# Patient Record
Sex: MALE | Race: WHITE | NOT HISPANIC OR LATINO | Employment: FULL TIME | URBAN - METROPOLITAN AREA
[De-identification: names, ages, dates, MRNs, and addresses within clinical notes are randomized per-mention and may not be internally consistent; named-entity substitution may affect disease eponyms.]

---

## 2017-05-08 ENCOUNTER — ALLSCRIPTS OFFICE VISIT (OUTPATIENT)
Dept: OTHER | Facility: OTHER | Age: 47
End: 2017-05-08

## 2018-01-10 NOTE — RESULT NOTES
Message   Lyme is negative; labs are stable- lipds improved         Verified Results  (1) COMPREHENSIVE METABOLIC PANEL [id number] 78:90MB Emilie Oh     Test Name Result Flag Reference   Glucose, Serum 93 mg/dL  65-99   BUN 15 mg/dL  6-24   Creatinine, Serum 0 81 mg/dL  0 76-1 27   eGFR If NonAfricn Am 105 mL/min/1 73  >59   eGFR If Africn Am 121 mL/min/1 73  >59   BUN/Creatinine Ratio 19  9-20   Sodium, Serum 140 mmol/L  134-144   **Please note reference interval change**   Potassium, Serum 4 9 mmol/L  3 5-5 2   Chloride, Serum 98 mmol/L     **Please note reference interval change**   Carbon Dioxide, Total 27 mmol/L  18-29   Calcium, Serum 9 9 mg/dL  8 7-10 2   Protein, Total, Serum 7 6 g/dL  6 0-8 5   Albumin, Serum 4 8 g/dL  3 5-5 5   Globulin, Total 2 8 g/dL  1 5-4 5   A/G Ratio 1 7  1 1-2 5   Bilirubin, Total 0 6 mg/dL  0 0-1 2   Alkaline Phosphatase, S 83 IU/L     AST (SGOT) 34 IU/L  0-40   ALT (SGPT) 47 IU/L H 0-44     (1) CBC/PLT/DIFF [id number] 11:28AM Emilie Oh     Test Name Result Flag Reference   WBC 7 6 x10E3/uL  3 4-10 8   RBC 5 26 x10E6/uL  4 14-5 80   Hemoglobin 15 9 g/dL  12 6-17 7   Hematocrit 48 4 %  37 5-51 0   MCV 92 fL  79-97   MCH 30 2 pg  26 6-33 0   MCHC 32 9 g/dL  31 5-35 7   RDW 13 3 %  12 3-15 4   Platelets 222 L30L9/EV  150-379   Neutrophils 69 %     Lymphs 22 %     Monocytes 6 %     Eos 2 %     Basos 1 %     Neutrophils (Absolute) 5 3 x10E3/uL  1 4-7 0   Lymphs (Absolute) 1 6 x10E3/uL  0 7-3 1   Monocytes(Absolute) 0 5 x10E3/uL  0 1-0 9   Eos (Absolute) 0 1 x10E3/uL  0 0-0 4   Baso (Absolute) 0 0 x10E3/uL  0 0-0 2   Immature Granulocytes 0 %     Immature Grans (Abs) 0 0 x10E3/uL  0 0-0 1     (1) TSH [id number] 11:28AM Emilie Oh     Test Name Result Flag Reference   TSH 1 710 uIU/mL  0 450-4 500     (LC) Lipid Panel [id number] 11:28AM Emilie Oh     Test Name Result Flag Reference   Cholesterol, Total 220 mg/dL H 100-199 Triglycerides 81 mg/dL  0-149   HDL Cholesterol 72 mg/dL  >39   VLDL Cholesterol Spencer 16 mg/dL  5-40   LDL Cholesterol Calc 132 mg/dL H 0-99     (LC) Lyme Ab/Western Blot Reflex [id number] 11:28AM Praveena OSBORNE courtesy copy of this report has been sent to  [address]  Test Name Result Flag Reference   Lyme IgG/IgM Ab <0 91 ISR  0 00-0 90   Negative         <0 91                                                 Equivocal  0 91 - 1 09                                                 Positive         >1 09   Lyme Disease Ab, Quant, IgM <0 80 index  0 00-0 79   Negative         <0 80                                                 Equivocal  0 80 - 1 19                                                 Positive         >1 19                  IgM levels may peak at 3-6 weeks post infection, then                  gradually decline

## 2018-01-12 VITALS
RESPIRATION RATE: 16 BRPM | DIASTOLIC BLOOD PRESSURE: 82 MMHG | BODY MASS INDEX: 33.92 KG/M2 | HEIGHT: 69 IN | HEART RATE: 72 BPM | SYSTOLIC BLOOD PRESSURE: 128 MMHG | TEMPERATURE: 98 F | WEIGHT: 229 LBS

## 2018-01-12 NOTE — RESULT NOTES
Verified Results  (1) CBC/PLT/DIFF [id number] 10:06AM [facility]ivani [employer]     Test Name Result Flag Reference   WBC 6 8 x10E3/uL  3 4-10 8   RBC 5 21 x10E6/uL  4 14-5 80   Hemoglobin 15 9 g/dL  12 6-17 7   Hematocrit 46 6 %  37 5-51 0   MCV 89 fL  79-97   MCH 30 5 pg  26 6-33 0   MCHC 34 1 g/dL  31 5-35 7   RDW 13 8 %  12 3-15 4   Platelets 440 Y30H9/IO  150-379   Neutrophils 64 %     Lymphs 24 %     Monocytes 8 %     Eos 3 %     Basos 1 %     Neutrophils (Absolute) 4 4 x10E3/uL  1 4-7 0   Lymphs (Absolute) 1 6 x10E3/uL  0 7-3 1   Monocytes(Absolute) 0 5 x10E3/uL  0 1-0 9   Eos (Absolute) 0 2 x10E3/uL  0 0-0 4   Baso (Absolute) 0 1 x10E3/uL  0 0-0 2   Immature Granulocytes 0 %     Immature Grans (Abs) 0 0 x10E3/uL  0 0-0 1     (1) COMPREHENSIVE METABOLIC PANEL [id number] 59:67AW [facility]iv[facility] [employer]     Test Name Result Flag Reference   Glucose, Serum 95 mg/dL  65-99   BUN 12 mg/dL  6-24   Creatinine, Serum 0 90 mg/dL  0 76-1 27   eGFR If NonAfricn Am 101 mL/min/1 73  >59   eGFR If Africn Am 117 mL/min/1 73  >59   BUN/Creatinine Ratio 13  9-20   Sodium, Serum 138 mmol/L  134-144   Potassium, Serum 5 4 mmol/L H 3 5-5 2   Chloride, Serum 99 mmol/L     Carbon Dioxide, Total 25 mmol/L  18-29   Calcium, Serum 9 8 mg/dL  8 7-10 2   Protein, Total, Serum 7 3 g/dL  6 0-8 5   Albumin, Serum 4 8 g/dL  3 5-5 5   Globulin, Total 2 5 g/dL  1 5-4 5   A/G Ratio 1 9  1 1-2 5   Bilirubin, Total 0 6 mg/dL  0 0-1 2   Alkaline Phosphatase, S 82 IU/L     AST (SGOT) 29 IU/L  0-40   ALT (SGPT) 33 IU/L  0-44     (LC) UA/M w/rflx Culture, Routine [id number] 10:06AM Shivani Carry     Test Name Result Flag Reference   Specific Gravity 1 008  1 005-1 030   pH 7 0  5 0-7 5   Urine-Color Yellow  Yellow   Appearance Clear  Clear   WBC Esterase Negative  Negative   Protein Negative  Negative/Trace   Glucose Negative  Negative   Ketones Negative  Negative   Occult Blood Negative  Negative   Bilirubin Negative  Negative Urobilinogen,Semi-Qn 0 2 EU/dL  0 2-1 0   Nitrite, Urine Negative  Negative   Microscopic Examination Comment     Microscopic follows if indicated  Microscopic Examination See below:     Urinalysis Reflex Comment     This specimen will not reflex to a Urine Culture  WBC None seen /hpf  0 -  5   RBC None seen /hpf  0 -  2   Epithelial Cells (non renal) None seen /hpf  0 - 10   Bacteria Few  None seen/Few     (LC) Lipid Panel [id number] 10:06AM [employer]     Test Name Result Flag Reference   Cholesterol, Total 215 mg/dL H 100-199   Triglycerides 108 mg/dL  0-149   HDL Cholesterol 51 mg/dL  >39   According to ATP-III Guidelines, HDL-C >59 mg/dL is considered a  negative risk factor for CHD  VLDL Cholesterol Spencer 22 mg/dL  5-40   LDL Cholesterol Calc 142 mg/dL H 0-99     (1) TSH [id number] 10:06AM [employer]     Test Name Result Flag Reference   TSH 1 800 uIU/mL  0 450-4 500     (LC) Rheumatoid Arthritis Factor [id number] 10:06AM [employer]     Test Name Result Flag Reference   RA Latex Turbid  6 9 IU/mL  0 0-13 9     (LC) Sedimentation Rate-Westergren [id number] 10:06AM [employer]     Test Name Result Flag Reference   Sedimentation Rate-Westergren 6 mm/hr  0-15     [facility]) Mcbrides Dines CMP14 Default [id number] 10:06AM [employer]     Test Name Result Flag Reference   Smith Dines CMP14 Default Comment     A hand-written panel/profile was received from your office  In  accordance with the [facility] Ambiguous Test Code Policy dated July 0119, we have completed your order by using the closest currently  or formerly recognized AMA panel  We have assigned Comprehensive  Metabolic Panel (14), Test Code #317234 to this request   If this  is not the testing you wished to receive on this specimen, please  contact the [address] [place] [address] Client Inquiry/Technical Services Department  to clarify the test order  We appreciate your business       [facility]) [address] Default [id number] 10: Chuck Sever Sidonie Matter     Test Name Result Flag Reference   [employer]yn[employer] Default Comment     A hand-written panel/profile was received from your office  In  accordance with the [facility] Ambiguous Test Code Policy dated July 6808, we have completed your order by using the closest currently  or formerly recognized AMA panel  We have assigned Lipid Panel,  Test Code #238981 to this request  If this is not the testing you  wished to receive on this specimen, please contact the [address] [place] [address]  Client Inquiry/Technical Services Department to clarify the test  order  We appreciate your business  Plan  Abnormal laboratory test    · (1) POTASSIUM; Status:Active;  Requested for:[id number];

## 2018-01-12 NOTE — RESULT NOTES
Verified Results  ([address] [facility]) Lyme, Western Blot, Serum [id number] 10:06AM [employer]     Test Name Result Flag Reference   IgG P93 Ab  Absent     IgG P66 Ab  Absent     IgG P58 Ab  Absent     IgG P45 Ab  Absent     IgG P41 Ab  Present A    IgG P39 Ab  Absent     IgG P30 Ab  Absent     IgG P28 Ab  Absent     IgG P23 Ab  Absent     IgG P18 Ab  Present A    Lyme IgG WB Interp  Negative     Positive: 5 of the following                                                Borrelia-specific bands:                                                18,23,28,30,39,41,45,58,                                                66, and 93  Negative: No bands or banding                                                patterns which do not                                                meet positive criteria  IgM P41 Ab  Absent     IgM P39 Ab  Absent     IgM P23 Ab  Absent     Lyme IgM WB Interp  Negative     Note: An equivocal or positive EIA result followed by a negative  Western Blot result is considered NEGATIVE  An equivocal or positive  EIA result followed by a positive Western Blot is considered POSITIVE  by the CDC  Positive: 2 of the following bands: 23,39 or 41  Negative: No bands or banding patterns which do not meet positive  criteria  Criteria for positivity are those recommended by CDC/ASTPHLD   p23=Osp C, q47=ddfhtueeb  Note:  Sera from individuals with the following may cross react in the  Lyme Western Blot assays: other spirochetal diseases (periodontal  disease, leptospirosis, relapsing fever, yaws, and pinta);  connective autoimmune (Rheumatoid Arthritis and Systemic Lupus  Erythematosus and also individuals with Antinuclear Antibody);  other infections COFFEE [facility] Spotted Fever; Cecil-Barr Virus,  and Cytomegalovirus)       (LC) Anti-dsDNA Antibodies [id number] 10:06AM [employer]     Test Name Result Flag Reference   Anti-DNA (DS) Ab Qn 2 IU/mL  0-9   Negative <5                                                    Equivocal  5 - 9                                                    Positive      >9     (LC) Antinuclear Antibodies Direct [id number] 10:06AM H[facility] Watson     Test Name Result Flag Reference   QUENTIN Direct Negative  Negative

## 2022-05-20 ENCOUNTER — HOSPITAL ENCOUNTER (EMERGENCY)
Facility: HOSPITAL | Age: 52
Discharge: HOME | End: 2022-05-20
Attending: EMERGENCY MEDICINE
Payer: OTHER MISCELLANEOUS

## 2022-05-20 ENCOUNTER — APPOINTMENT (EMERGENCY)
Dept: RADIOLOGY | Facility: HOSPITAL | Age: 52
End: 2022-05-20

## 2022-05-20 VITALS
BODY MASS INDEX: 33.82 KG/M2 | SYSTOLIC BLOOD PRESSURE: 139 MMHG | RESPIRATION RATE: 18 BRPM | TEMPERATURE: 97.9 F | WEIGHT: 229 LBS | HEART RATE: 75 BPM | OXYGEN SATURATION: 98 % | DIASTOLIC BLOOD PRESSURE: 95 MMHG

## 2022-05-20 DIAGNOSIS — S40.012A CONTUSION OF LEFT SHOULDER, INITIAL ENCOUNTER: Primary | ICD-10-CM

## 2022-05-20 PROCEDURE — 99284 EMERGENCY DEPT VISIT MOD MDM: CPT | Performed by: EMERGENCY MEDICINE

## 2022-05-20 PROCEDURE — 99283 EMERGENCY DEPT VISIT LOW MDM: CPT

## 2022-05-20 PROCEDURE — 73030 X-RAY EXAM OF SHOULDER: CPT

## 2022-05-20 RX ORDER — IBUPROFEN 400 MG/1
800 TABLET ORAL ONCE
Status: COMPLETED | OUTPATIENT
Start: 2022-05-20 | End: 2022-05-20

## 2022-05-20 RX ADMIN — IBUPROFEN 800 MG: 400 TABLET, FILM COATED ORAL at 20:07

## 2022-05-20 NOTE — Clinical Note
Milagro Perez was seen and treated in our emergency department on 5/20/2022  should do light duty work for the next several work days    Diagnosis: L shoulder contusion    Von Degree  may return to work on return date  He may return on this date: 05/24/2022         If you have any questions or concerns, please don't hesitate to call        Delmi Singer, DO    ______________________________           _______________          _______________  Hospital Representative                              Date                                Time

## 2022-05-21 NOTE — ED ATTENDING ATTESTATION
5/20/2022  IKandice MD, saw and evaluated the patient  I have discussed the patient with the resident/non-physician practitioner and agree with the resident's/non-physician practitioner's findings, Plan of Care, and MDM as documented in the resident's/non-physician practitioner's note, except where noted  All available labs and Radiology studies were reviewed  I was present for key portions of any procedure(s) performed by the resident/non-physician practitioner and I was immediately available to provide assistance  At this point I agree with the current assessment done in the Emergency Department  I have conducted an independent evaluation of this patient a history and physical is as follows:  61-year-old male was working on the back of a truck when he fell off the edge, landing on his left shoulder  Patient has had pain in the shoulder since  Initially, he continued to work, but then he noted that there was a bump  The patient states that he has normal range of motion and normal strength  He has no numbness, tingling, or weakness  He has a prior stage to [facility] separation on that side  On exam patient is neurovascularly intact  He does have AC separation on exam, but normal range of motion, no evidence of muscle atrophy    Will plan to x-ray, treat symptomatically  ED Course         Critical Care Time  Procedures

## 2022-05-21 NOTE — ED PROVIDER NOTES
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 and fell to the ground  Patient states that he landed on his left shoulder  Denies LOC, denies head strike, denies anticoagulation  Patient was able to stand after the incident, but noticed increased pain in the left shoulder  He states that he has a previous history of a grade 2 separation approximately 25 years ago, improved with physical therapy  Patient is concerned that he might have re-injured his shoulder  No other injuries to report  Denies neck pain  Patient denies pain at the medial portion of the clavicle, elbow, forearm, hand and wrist           None       History reviewed  No pertinent past medical history  History reviewed  No pertinent surgical history  History reviewed  No pertinent family history  I have reviewed and agree with the history as documented  E-Cigarette/Vaping     E-Cigarette/Vaping Substances           Review of Systems   Constitutional: Negative for chills and fever  HENT: Negative for ear pain and sore throat  Eyes: Negative for pain and visual disturbance  Respiratory: Negative for cough and shortness of breath  Cardiovascular: Negative for chest pain and palpitations  Gastrointestinal: Negative for abdominal pain and vomiting  Genitourinary: Negative for dysuria and hematuria  Musculoskeletal: Positive for myalgias (Left shoulder)  Negative for back pain  Skin: Negative for color change and rash  Neurological: Negative for seizures and syncope  All other systems reviewed and are negative        Physical Exam  ED Triage Vitals   Temperature Pulse Respirations Blood Pressure SpO2   05/20/22 1858 05/20/22 1858 05/20/22 1858 05/20/22 1858 05/20/22 1858   97 9 °F (36 6 °C) 75 18 139/95 98 %      Temp Source Heart Rate Source Patient Position - Orthostatic VS BP Location FiO2 (%)   05/20/22 1858 05/20/22 1858 05/20/22 1858 05/20/22 1858 --   Temporal Monitor Sitting Right arm       Pain Score       05/20/22 2007       8             Orthostatic Vital Signs  Vitals:    05/20/22 1858   BP: 139/95   Pulse: 75   Patient Position - Orthostatic VS: Sitting       Physical Exam  Vitals and nursing note reviewed  Constitutional:       General: He is not in acute distress  Appearance: Normal appearance  He is well-developed  He is not ill-appearing or toxic-appearing  HENT:      Head: Normocephalic and atraumatic  Right Ear: External ear normal       Left Ear: External ear normal       Nose: Nose normal  No congestion  Mouth/Throat:      Mouth: Mucous membranes are moist       Pharynx: Oropharynx is clear  Eyes:      General: No scleral icterus  Extraocular Movements: Extraocular movements intact  Conjunctiva/sclera: Conjunctivae normal       Pupils: Pupils are equal, round, and reactive to light  Cardiovascular:      Rate and Rhythm: Normal rate and regular rhythm  Pulses: Normal pulses  Heart sounds: Normal heart sounds  No murmur heard  Pulmonary:      Effort: Pulmonary effort is normal  No respiratory distress  Breath sounds: Normal breath sounds  No wheezing, rhonchi or rales  Abdominal:      General: Abdomen is flat  There is no distension  Palpations: Abdomen is soft  Musculoskeletal:      Right shoulder: Normal       Left shoulder: Deformity (At the distal end of the clavicle, where it meets the acromion process) and tenderness (Anterior and lateral shoulder) present  No laceration or crepitus  Normal range of motion (Continues to have full range of motion in flexion, extension, abduction, and adduction)  Normal strength   Normal pulse       Cervical back: Normal range of motion and neck supple  No tenderness  Skin:     General: Skin is warm and dry  Capillary Refill: Capillary refill takes less than 2 seconds  Neurological:      General: No focal deficit present  Mental Status: He is alert and oriented to person, place, and time  Sensory: No sensory deficit  Motor: No weakness  Comments: Strength 5/5 bilateral upper lower extremities  Psychiatric:         Mood and Affect: Mood normal          ED Medications  Medications   ibuprofen (MOTRIN) tablet 800 mg (800 mg Oral Given 5/20/22 2007)       Diagnostic Studies  Results Reviewed     None                 XR shoulder 2+ views LEFT    (Results Pending)         Procedures  Procedures      ED Course       XR shoulder ordered  Given Motrin 600 mg PO for pain control  XR reviewed with patient  Given ortho follow up  Also given sling for comfort  Return precautions given  Patient agreeable with discharge  MDM    Disposition  Final diagnoses:   Contusion of left shoulder, initial encounter     Time reflects when diagnosis was documented in both MDM as applicable and the Disposition within this note     Time User Action Codes Description Comment    5/20/2022  9:13 PM Rosemarie Miller Add [S40 [id number]] Contusion of left shoulder, initial encounter       ED Disposition     ED Disposition   Discharge    Condition   Stable    Date/Time   Fri May 20, 2022  9:12 PM    Comment   Kayla Pontiff discharge to home/self care                 Follow-up Information     Follow up With Specialties Details Why Contact Info Additional [address] [facility] Orthopedic Surgery Call  As needed Elaine [address] [phone] Kristin Dobson, [address], [address] [facility]  Use Entrance A           There are no discharge medications for this patient  No discharge procedures on file  PDMP Review     None           ED Provider  Attending physically available and evaluated [place]  I managed the patient along with the ED Attending      Electronically Signed by         Tevin Melendez DO  05/20/22 8282

## 2022-05-21 NOTE — DISCHARGE INSTRUCTIONS
Massiel use Motrin and Tylenol at home for pain control  Do gentle stretching exercises to keep shoulder loose  Consider following up with ortho if persistent pain  Light lifting at work  Return to the ED with any new/concerning issues

## 2024-08-01 ENCOUNTER — APPOINTMENT (EMERGENCY)
Dept: RADIOLOGY | Facility: HOSPITAL | Age: 54
End: 2024-08-01
Payer: COMMERCIAL

## 2024-08-01 ENCOUNTER — HOSPITAL ENCOUNTER (OUTPATIENT)
Facility: HOSPITAL | Age: 54
Setting detail: OBSERVATION
Discharge: HOME | End: 2024-08-04
Attending: EMERGENCY MEDICINE | Admitting: INTERNAL MEDICINE
Payer: COMMERCIAL

## 2024-08-01 DIAGNOSIS — A04.5 CAMPYLOBACTER ENTERITIS: ICD-10-CM

## 2024-08-01 DIAGNOSIS — A41.9 ACUTE SEPSIS (HCC): ICD-10-CM

## 2024-08-01 DIAGNOSIS — R19.7 DIARRHEA: Primary | ICD-10-CM

## 2024-08-01 DIAGNOSIS — R74.01 TRANSAMINITIS: ICD-10-CM

## 2024-08-01 DIAGNOSIS — R19.7 DIARRHEA OF PRESUMED INFECTIOUS ORIGIN: ICD-10-CM

## 2024-08-01 DIAGNOSIS — J34.89 NASAL LESION: ICD-10-CM

## 2024-08-01 LAB
ALBUMIN SERPL BCG-MCNC: 3.3 G/DL (ref 3.5–5)
ALP SERPL-CCNC: 55 U/L (ref 34–104)
ALT SERPL W P-5'-P-CCNC: 165 U/L (ref 7–52)
ANION GAP SERPL CALCULATED.3IONS-SCNC: 8 MMOL/L (ref 4–13)
ANISOCYTOSIS BLD QL SMEAR: PRESENT
APTT PPP: 29 SECONDS (ref 23–34)
AST SERPL W P-5'-P-CCNC: 119 U/L (ref 13–39)
BASOPHILS # BLD MANUAL: 0 THOUSAND/UL (ref 0–0.1)
BASOPHILS NFR MAR MANUAL: 0 % (ref 0–1)
BILIRUB SERPL-MCNC: 0.87 MG/DL (ref 0.2–1)
BILIRUB UR QL STRIP: NEGATIVE
BUN SERPL-MCNC: 13 MG/DL (ref 5–25)
CALCIUM ALBUM COR SERPL-MCNC: 8.8 MG/DL (ref 8.3–10.1)
CALCIUM SERPL-MCNC: 8.2 MG/DL (ref 8.4–10.2)
CHLORIDE SERPL-SCNC: 96 MMOL/L (ref 96–108)
CLARITY UR: CLEAR
CO2 SERPL-SCNC: 24 MMOL/L (ref 21–32)
COLOR UR: YELLOW
CREAT SERPL-MCNC: 0.97 MG/DL (ref 0.6–1.3)
EOSINOPHIL # BLD MANUAL: 0 THOUSAND/UL (ref 0–0.4)
EOSINOPHIL NFR BLD MANUAL: 0 % (ref 0–6)
ERYTHROCYTE [DISTWIDTH] IN BLOOD BY AUTOMATED COUNT: 13.2 % (ref 11.6–15.1)
GFR SERPL CREATININE-BSD FRML MDRD: 87 ML/MIN/1.73SQ M
GLUCOSE SERPL-MCNC: 92 MG/DL (ref 65–140)
GLUCOSE UR STRIP-MCNC: NEGATIVE MG/DL
HCT VFR BLD AUTO: 38.3 % (ref 36.5–49.3)
HGB BLD-MCNC: 13.2 G/DL (ref 12–17)
HGB UR QL STRIP.AUTO: ABNORMAL
INR PPP: 1.2 (ref 0.85–1.19)
KETONES UR STRIP-MCNC: ABNORMAL MG/DL
LACTATE SERPL-SCNC: 1 MMOL/L (ref 0.5–2)
LEUKOCYTE ESTERASE UR QL STRIP: NEGATIVE
LIPASE SERPL-CCNC: 25 U/L (ref 11–82)
LYMPHOCYTES # BLD AUTO: 65 % (ref 14–44)
LYMPHOCYTES # BLD AUTO: 8.5 THOUSAND/UL (ref 0.6–4.47)
MCH RBC QN AUTO: 29.9 PG (ref 26.8–34.3)
MCHC RBC AUTO-ENTMCNC: 34.5 G/DL (ref 31.4–37.4)
MCV RBC AUTO: 87 FL (ref 82–98)
MONOCYTES # BLD AUTO: 0.25 THOUSAND/UL (ref 0–1.22)
MONOCYTES NFR BLD: 2 % (ref 4–12)
NEUTROPHILS # BLD MANUAL: 3.75 THOUSAND/UL (ref 1.85–7.62)
NEUTS BAND NFR BLD MANUAL: 7 % (ref 0–8)
NEUTS SEG NFR BLD AUTO: 23 % (ref 43–75)
NITRITE UR QL STRIP: NEGATIVE
PH UR STRIP.AUTO: 6 [PH]
PLATELET # BLD AUTO: 225 THOUSANDS/UL (ref 149–390)
PLATELET BLD QL SMEAR: ADEQUATE
PMV BLD AUTO: 10.2 FL (ref 8.9–12.7)
POLYCHROMASIA BLD QL SMEAR: PRESENT
POTASSIUM SERPL-SCNC: 3.8 MMOL/L (ref 3.5–5.3)
PROCALCITONIN SERPL-MCNC: 0.86 NG/ML
PROT SERPL-MCNC: 6.9 G/DL (ref 6.4–8.4)
PROT UR STRIP-MCNC: ABNORMAL MG/DL
PROTHROMBIN TIME: 15.9 SECONDS (ref 12.3–15)
RBC # BLD AUTO: 4.41 MILLION/UL (ref 3.88–5.62)
RBC MORPH BLD: PRESENT
SODIUM SERPL-SCNC: 128 MMOL/L (ref 135–147)
SP GR UR STRIP.AUTO: 1.02 (ref 1–1.03)
UROBILINOGEN UR STRIP-ACNC: <2 MG/DL
VARIANT LYMPHS # BLD AUTO: 3 %
WBC # BLD AUTO: 12.5 THOUSAND/UL (ref 4.31–10.16)

## 2024-08-01 PROCEDURE — 96365 THER/PROPH/DIAG IV INF INIT: CPT

## 2024-08-01 PROCEDURE — 93005 ELECTROCARDIOGRAM TRACING: CPT

## 2024-08-01 PROCEDURE — 81001 URINALYSIS AUTO W/SCOPE: CPT | Performed by: EMERGENCY MEDICINE

## 2024-08-01 PROCEDURE — 85610 PROTHROMBIN TIME: CPT | Performed by: EMERGENCY MEDICINE

## 2024-08-01 PROCEDURE — 83605 ASSAY OF LACTIC ACID: CPT | Performed by: EMERGENCY MEDICINE

## 2024-08-01 PROCEDURE — 36415 COLL VENOUS BLD VENIPUNCTURE: CPT | Performed by: EMERGENCY MEDICINE

## 2024-08-01 PROCEDURE — 87040 BLOOD CULTURE FOR BACTERIA: CPT | Performed by: EMERGENCY MEDICINE

## 2024-08-01 PROCEDURE — 85007 BL SMEAR W/DIFF WBC COUNT: CPT | Performed by: EMERGENCY MEDICINE

## 2024-08-01 PROCEDURE — 99285 EMERGENCY DEPT VISIT HI MDM: CPT | Performed by: EMERGENCY MEDICINE

## 2024-08-01 PROCEDURE — 96366 THER/PROPH/DIAG IV INF ADDON: CPT

## 2024-08-01 PROCEDURE — 85730 THROMBOPLASTIN TIME PARTIAL: CPT | Performed by: EMERGENCY MEDICINE

## 2024-08-01 PROCEDURE — 80053 COMPREHEN METABOLIC PANEL: CPT | Performed by: EMERGENCY MEDICINE

## 2024-08-01 PROCEDURE — 83690 ASSAY OF LIPASE: CPT | Performed by: EMERGENCY MEDICINE

## 2024-08-01 PROCEDURE — 85027 COMPLETE CBC AUTOMATED: CPT | Performed by: EMERGENCY MEDICINE

## 2024-08-01 PROCEDURE — 71046 X-RAY EXAM CHEST 2 VIEWS: CPT

## 2024-08-01 PROCEDURE — 99284 EMERGENCY DEPT VISIT MOD MDM: CPT

## 2024-08-01 PROCEDURE — 84145 PROCALCITONIN (PCT): CPT | Performed by: EMERGENCY MEDICINE

## 2024-08-01 RX ADMIN — NYSTATIN 500000 UNITS: 100000 SUSPENSION ORAL at 23:54

## 2024-08-01 RX ADMIN — PIPERACILLIN SODIUM AND TAZOBACTAM SODIUM 4.5 G: 36; 4.5 INJECTION, POWDER, LYOPHILIZED, FOR SOLUTION INTRAVENOUS at 22:58

## 2024-08-01 RX ADMIN — SODIUM CHLORIDE 1000 ML: 0.9 INJECTION, SOLUTION INTRAVENOUS at 22:46

## 2024-08-02 ENCOUNTER — APPOINTMENT (EMERGENCY)
Dept: CT IMAGING | Facility: HOSPITAL | Age: 54
End: 2024-08-02
Payer: COMMERCIAL

## 2024-08-02 PROBLEM — R19.7 DIARRHEA OF PRESUMED INFECTIOUS ORIGIN: Status: ACTIVE | Noted: 2024-08-02

## 2024-08-02 PROBLEM — A41.9 SEPSIS (HCC): Status: ACTIVE | Noted: 2024-08-02

## 2024-08-02 PROBLEM — E87.1 HYPONATREMIA: Status: ACTIVE | Noted: 2024-08-02

## 2024-08-02 PROBLEM — R16.1 SPLENOMEGALY: Status: ACTIVE | Noted: 2024-08-02

## 2024-08-02 PROBLEM — B37.0 ORAL THRUSH: Status: ACTIVE | Noted: 2024-08-02

## 2024-08-02 LAB
ANION GAP SERPL CALCULATED.3IONS-SCNC: 5 MMOL/L (ref 4–13)
BACTERIA UR QL AUTO: ABNORMAL /HPF
BASOPHILS # BLD MANUAL: 0.11 THOUSAND/UL (ref 0–0.1)
BASOPHILS NFR MAR MANUAL: 1 % (ref 0–1)
BUN SERPL-MCNC: 10 MG/DL (ref 5–25)
C COLI+JEJUNI TUF STL QL NAA+PROBE: POSITIVE
C DIFF TOX GENS STL QL NAA+PROBE: NEGATIVE
CALCIUM SERPL-MCNC: 6.9 MG/DL (ref 8.4–10.2)
CHLORIDE SERPL-SCNC: 104 MMOL/L (ref 96–108)
CO2 SERPL-SCNC: 23 MMOL/L (ref 21–32)
CREAT SERPL-MCNC: 0.85 MG/DL (ref 0.6–1.3)
EC STX1+STX2 GENES STL QL NAA+PROBE: NEGATIVE
EOSINOPHIL # BLD MANUAL: 0 THOUSAND/UL (ref 0–0.4)
EOSINOPHIL NFR BLD MANUAL: 0 % (ref 0–6)
ERYTHROCYTE [DISTWIDTH] IN BLOOD BY AUTOMATED COUNT: 13.4 % (ref 11.6–15.1)
GFR SERPL CREATININE-BSD FRML MDRD: 98 ML/MIN/1.73SQ M
GLUCOSE SERPL-MCNC: 82 MG/DL (ref 65–140)
HCT VFR BLD AUTO: 35.4 % (ref 36.5–49.3)
HGB BLD-MCNC: 12 G/DL (ref 12–17)
LYMPHOCYTES # BLD AUTO: 44 % (ref 14–44)
LYMPHOCYTES # BLD AUTO: 5.36 THOUSAND/UL (ref 0.6–4.47)
MAGNESIUM SERPL-MCNC: 2.1 MG/DL (ref 1.9–2.7)
MCH RBC QN AUTO: 29.4 PG (ref 26.8–34.3)
MCHC RBC AUTO-ENTMCNC: 33.9 G/DL (ref 31.4–37.4)
MCV RBC AUTO: 87 FL (ref 82–98)
MONOCYTES # BLD AUTO: 0.57 THOUSAND/UL (ref 0–1.22)
MONOCYTES NFR BLD: 5 % (ref 4–12)
MUCOUS THREADS UR QL AUTO: ABNORMAL
NEUTROPHILS # BLD MANUAL: 5.36 THOUSAND/UL (ref 1.85–7.62)
NEUTS BAND NFR BLD MANUAL: 5 % (ref 0–8)
NEUTS SEG NFR BLD AUTO: 42 % (ref 43–75)
NON-SQ EPI CELLS URNS QL MICRO: ABNORMAL /HPF
PLATELET # BLD AUTO: 203 THOUSANDS/UL (ref 149–390)
PLATELET BLD QL SMEAR: ADEQUATE
PMV BLD AUTO: 10.2 FL (ref 8.9–12.7)
POTASSIUM SERPL-SCNC: 3.2 MMOL/L (ref 3.5–5.3)
PROCALCITONIN SERPL-MCNC: 0.86 NG/ML
RBC # BLD AUTO: 4.08 MILLION/UL (ref 3.88–5.62)
RBC #/AREA URNS AUTO: ABNORMAL /HPF
RBC MORPH BLD: NORMAL
SALMONELLA SP SPAO STL QL NAA+PROBE: NEGATIVE
SHIGELLA SP+EIEC IPAH STL QL NAA+PROBE: NEGATIVE
SMUDGE CELLS BLD QL SMEAR: PRESENT
SODIUM SERPL-SCNC: 132 MMOL/L (ref 135–147)
VARIANT LYMPHS # BLD AUTO: 3 %
WBC # BLD AUTO: 11.4 THOUSAND/UL (ref 4.31–10.16)
WBC #/AREA URNS AUTO: ABNORMAL /HPF

## 2024-08-02 PROCEDURE — 87506 IADNA-DNA/RNA PROBE TQ 6-11: CPT | Performed by: EMERGENCY MEDICINE

## 2024-08-02 PROCEDURE — 36415 COLL VENOUS BLD VENIPUNCTURE: CPT

## 2024-08-02 PROCEDURE — 83735 ASSAY OF MAGNESIUM: CPT | Performed by: INTERNAL MEDICINE

## 2024-08-02 PROCEDURE — 84145 PROCALCITONIN (PCT): CPT

## 2024-08-02 PROCEDURE — 80048 BASIC METABOLIC PNL TOTAL CA: CPT

## 2024-08-02 PROCEDURE — 96366 THER/PROPH/DIAG IV INF ADDON: CPT

## 2024-08-02 PROCEDURE — 99204 OFFICE O/P NEW MOD 45 MIN: CPT | Performed by: INTERNAL MEDICINE

## 2024-08-02 PROCEDURE — 85027 COMPLETE CBC AUTOMATED: CPT

## 2024-08-02 PROCEDURE — 74177 CT ABD & PELVIS W/CONTRAST: CPT

## 2024-08-02 PROCEDURE — 99223 1ST HOSP IP/OBS HIGH 75: CPT | Performed by: INTERNAL MEDICINE

## 2024-08-02 PROCEDURE — 85007 BL SMEAR W/DIFF WBC COUNT: CPT

## 2024-08-02 PROCEDURE — 83993 ASSAY FOR CALPROTECTIN FECAL: CPT

## 2024-08-02 PROCEDURE — 87177 OVA AND PARASITES SMEARS: CPT | Performed by: NURSE PRACTITIONER

## 2024-08-02 PROCEDURE — 87209 SMEAR COMPLEX STAIN: CPT | Performed by: NURSE PRACTITIONER

## 2024-08-02 PROCEDURE — 96367 TX/PROPH/DG ADDL SEQ IV INF: CPT

## 2024-08-02 RX ORDER — ENOXAPARIN SODIUM 100 MG/ML
40 INJECTION SUBCUTANEOUS DAILY
Status: DISCONTINUED | OUTPATIENT
Start: 2024-08-02 | End: 2024-08-04 | Stop reason: HOSPADM

## 2024-08-02 RX ORDER — AZITHROMYCIN 250 MG/1
500 TABLET, FILM COATED ORAL EVERY 24 HOURS
Status: DISCONTINUED | OUTPATIENT
Start: 2024-08-02 | End: 2024-08-04 | Stop reason: HOSPADM

## 2024-08-02 RX ORDER — SODIUM CHLORIDE 9 MG/ML
100 INJECTION, SOLUTION INTRAVENOUS CONTINUOUS
Status: DISCONTINUED | OUTPATIENT
Start: 2024-08-02 | End: 2024-08-02

## 2024-08-02 RX ORDER — POTASSIUM CHLORIDE 20 MEQ/1
40 TABLET, EXTENDED RELEASE ORAL EVERY 4 HOURS
Status: COMPLETED | OUTPATIENT
Start: 2024-08-02 | End: 2024-08-02

## 2024-08-02 RX ORDER — ACETAMINOPHEN 325 MG/1
650 TABLET ORAL EVERY 6 HOURS PRN
Status: DISCONTINUED | OUTPATIENT
Start: 2024-08-02 | End: 2024-08-04 | Stop reason: HOSPADM

## 2024-08-02 RX ORDER — SODIUM CHLORIDE, SODIUM LACTATE, POTASSIUM CHLORIDE, CALCIUM CHLORIDE 600; 310; 30; 20 MG/100ML; MG/100ML; MG/100ML; MG/100ML
125 INJECTION, SOLUTION INTRAVENOUS CONTINUOUS
Status: DISCONTINUED | OUTPATIENT
Start: 2024-08-02 | End: 2024-08-03

## 2024-08-02 RX ADMIN — NYSTATIN 500000 UNITS: 100000 SUSPENSION ORAL at 17:16

## 2024-08-02 RX ADMIN — NYSTATIN 500000 UNITS: 100000 SUSPENSION ORAL at 21:17

## 2024-08-02 RX ADMIN — SODIUM CHLORIDE 100 ML/HR: 0.9 INJECTION, SOLUTION INTRAVENOUS at 06:09

## 2024-08-02 RX ADMIN — NYSTATIN 500000 UNITS: 100000 SUSPENSION ORAL at 09:19

## 2024-08-02 RX ADMIN — POTASSIUM CHLORIDE 40 MEQ: 1500 TABLET, EXTENDED RELEASE ORAL at 17:16

## 2024-08-02 RX ADMIN — ENOXAPARIN SODIUM 40 MG: 40 INJECTION SUBCUTANEOUS at 09:20

## 2024-08-02 RX ADMIN — POTASSIUM CHLORIDE 40 MEQ: 1500 TABLET, EXTENDED RELEASE ORAL at 14:55

## 2024-08-02 RX ADMIN — VANCOMYCIN HYDROCHLORIDE 2000 MG: 5 INJECTION, POWDER, LYOPHILIZED, FOR SOLUTION INTRAVENOUS at 00:31

## 2024-08-02 RX ADMIN — PIPERACILLIN SODIUM AND TAZOBACTAM SODIUM 4.5 G: 36; 4.5 INJECTION, POWDER, LYOPHILIZED, FOR SOLUTION INTRAVENOUS at 06:10

## 2024-08-02 RX ADMIN — NYSTATIN 500000 UNITS: 100000 SUSPENSION ORAL at 14:11

## 2024-08-02 RX ADMIN — SODIUM CHLORIDE, SODIUM LACTATE, POTASSIUM CHLORIDE, AND CALCIUM CHLORIDE 125 ML/HR: .6; .31; .03; .02 INJECTION, SOLUTION INTRAVENOUS at 14:55

## 2024-08-02 RX ADMIN — AZITHROMYCIN DIHYDRATE 500 MG: 250 TABLET ORAL at 14:11

## 2024-08-02 RX ADMIN — IOHEXOL 90 ML: 350 INJECTION, SOLUTION INTRAVENOUS at 00:49

## 2024-08-02 NOTE — PROGRESS NOTES
Tevin Rebolledo is a 53 y.o. male who is currently ordered Vancomycin IV with management by the Pharmacy Consult service.  Relevant clinical data and objective / subjective history reviewed.  Vancomycin Assessment:  Indication and Goal AUC/Trough: Bacteremia (goal -600, trough >10)  Clinical Status: stable  Micro:     Renal Function:  SCr: 0.97 mg/dL  CrCl: 100.2 mL/min  Renal replacement: Not on dialysis  Days of Therapy: 1  Current Dose: vancomycin 2000 mg q12h  Vancomycin Plan:  New Dosing: vancomycin 1250 mg q12h  Estimated AUC: 515 mcg*hr/mL  Estimated Trough: 14.9 mcg/mL  Next Level: 8/3 0600  Renal Function Monitoring: Daily BMP and UOP  Pharmacy will continue to follow closely for s/sx of nephrotoxicity, infusion reactions and appropriateness of therapy.  BMP and CBC will be ordered per protocol. We will continue to follow the patient’s culture results and clinical progress daily.    Vinh Silva, Pharmacist

## 2024-08-02 NOTE — ASSESSMENT & PLAN NOTE
Presenting with 3 weeks of persistent diarrhea after he ate some undercooked chicken on 7/14.  Associated with intermittent lower abdominal pain, fever and night sweats.  Denies recent travel, sick contacts, history of IBD/IBS, recent antibiotic use  Seen by his PCP who ordered stool studies which were negative for any infectious diarrheal causes  CT A/P significant for liquid stool in the colon consistent with enteritis.  Negative for bowel obstruction  Fam Hx significant for colon cancer, biological father dx at age 50.   Pt had colonoscopy done 15 years ago which was grossly normal, is overdue for repeat   Fecal calprotectin ordered  GI consulted, recommendations pending  C. difficile and stool enteric panel ordered, pending  Continue IV antibiotics Zosyn pending cultures

## 2024-08-02 NOTE — ASSESSMENT & PLAN NOTE
Sodium 128 on admission, likely secondary to GI losses.   Improved with IV fluid hydration    Lab Results   Component Value Date/Time    SODIUM 132 (L) 08/03/2024 04:45 AM    SODIUM 132 (L) 08/02/2024 06:09 AM    SODIUM 128 (L) 08/01/2024 10:45 PM

## 2024-08-02 NOTE — ASSESSMENT & PLAN NOTE
Mild white film noted on the tongue, pt c/o dry mouth sensation.   Continue nystatin swish and swallow

## 2024-08-02 NOTE — ED PROVIDER NOTES
History  Chief Complaint   Patient presents with    Abdominal Pain     States on 7/14 ate some undercooked chicken, had diarrhea, fever, night sweats since. Has sinus discharge that he states is purulent. Has not had solid BM in 3 weeks. Has abdominal pain starting this week.      53-year-old male presents with fevers, ongoing diarrhea, night sweats, malaise, and abdominal pain that he describes as bilateral lower quadrant abdominal pain, patient denies any headache, dizziness, cough, chest pain, shortness of breath, dysuria, hematuria, no melena or hematochezia, he was previously seen by his primary care physician who did some lab testing and stool studies which did not show any infectious diarrheal causes, but did show that he was hyponatremic and hypochloremic, he therefore has been eating a lot of table salt and Gatorade.  The patient has no medical problems aside from genital herpes for which he has been on suppressive therapy with valacyclovir, no allergies, no surgeries.  He has had no recent foreign travel, drinks occasionally, does not smoke or use drugs.        None       History reviewed. No pertinent past medical history.    History reviewed. No pertinent surgical history.    History reviewed. No pertinent family history.  I have reviewed and agree with the history as documented.    E-Cigarette/Vaping     E-Cigarette/Vaping Substances     Social History     Tobacco Use    Smoking status: Never     Passive exposure: Never    Smokeless tobacco: Never   Substance Use Topics    Alcohol use: Not Currently     Alcohol/week: 1.0 standard drink of alcohol     Types: 1 Cans of beer per week    Drug use: Not Currently       Review of Systems   Constitutional:  Positive for fever.   HENT:  Positive for congestion.    Eyes:  Negative for visual disturbance.   Respiratory:  Negative for cough and shortness of breath.    Cardiovascular:  Negative for chest pain.   Gastrointestinal:  Positive for abdominal pain and  diarrhea. Negative for vomiting.   Endocrine: Negative for polyuria.   Genitourinary:  Negative for dysuria and hematuria.   Musculoskeletal:  Negative for myalgias.   Neurological:  Negative for dizziness and headaches.       Physical Exam  Physical Exam  Vitals and nursing note reviewed.   Constitutional:       General: He is not in acute distress.     Appearance: Normal appearance.   HENT:      Head: Normocephalic and atraumatic.      Right Ear: External ear normal.      Left Ear: External ear normal.      Mouth/Throat:      Mouth: Mucous membranes are moist.      Pharynx: Oropharynx is clear.      Comments: Oropharynx shows thrush  Eyes:      Conjunctiva/sclera: Conjunctivae normal.      Pupils: Pupils are equal, round, and reactive to light.   Cardiovascular:      Rate and Rhythm: Regular rhythm. Tachycardia present.      Heart sounds: Normal heart sounds.   Pulmonary:      Effort: Pulmonary effort is normal. No respiratory distress.      Breath sounds: Normal breath sounds.   Abdominal:      General: Abdomen is flat. There is no distension.      Palpations: Abdomen is soft.      Tenderness: There is abdominal tenderness in the right lower quadrant, suprapubic area and left lower quadrant. There is no right CVA tenderness or left CVA tenderness.   Musculoskeletal:         General: No deformity. Normal range of motion.      Cervical back: Normal range of motion.   Skin:     General: Skin is warm and dry.   Neurological:      General: No focal deficit present.      Mental Status: He is alert and oriented to person, place, and time.   Psychiatric:         Mood and Affect: Mood normal.         Behavior: Behavior normal.         Vital Signs  ED Triage Vitals   Temperature Pulse Respirations Blood Pressure SpO2   08/01/24 2035 08/01/24 2035 08/01/24 2035 08/01/24 2035 08/01/24 2035   99.8 °F (37.7 °C) (!) 109 18 137/84 95 %      Temp Source Heart Rate Source Patient Position - Orthostatic VS BP Location FiO2 (%)    08/01/24 2035 08/01/24 2035 08/01/24 2035 08/01/24 2035 --   Oral Monitor Lying Right arm       Pain Score       08/01/24 2255       5           Vitals:    08/01/24 2035 08/01/24 2255   BP: 137/84 123/69   Pulse: (!) 109 92   Patient Position - Orthostatic VS: Lying          Visual Acuity      ED Medications  Medications   vancomycin (VANCOCIN) 2,000 mg in sodium chloride 0.9 % 500 mL IVPB (2,000 mg Intravenous New Bag 8/2/24 0031)   nystatin (MYCOSTATIN) oral suspension 500,000 Units (500,000 Units Swish & Swallow Given 8/1/24 2354)   sodium chloride 0.9 % bolus 1,000 mL (1,000 mL Intravenous New Bag 8/1/24 2246)   piperacillin-tazobactam (ZOSYN) IVPB 4.5 g (0 g Intravenous Stopped 8/2/24 0031)       Diagnostic Studies  Results Reviewed       Procedure Component Value Units Date/Time    Clostridium difficile toxin by PCR with EIA [[id number]]     Lab Status: No result Specimen: Stool     Stool Enteric Bacterial Panel by PCR [[id number]]     Lab Status: No result Specimen: Stool     Urine Microscopic [[id number]]  (Abnormal) Collected: 08/01/24 2258    Lab Status: Final result Specimen: Urine, Clean Catch Updated: 08/02/24 0013     RBC, UA None Seen /hpf      WBC, UA 4-10 /hpf      Epithelial Cells None Seen /hpf      Bacteria, UA None Seen /hpf      MUCUS THREADS Occasional    RBC Morphology Reflex Test [[id number]] Collected: 08/01/24 2245    Lab Status: Final result Specimen: Blood from Arm, Right Updated: 08/02/24 0001    CBC and differential [[id number]]  (Abnormal) Collected: 08/01/24 2245    Lab Status: Final result Specimen: Blood from Arm, Right Updated: 08/01/24 2357     WBC 12.50 Thousand/uL      RBC 4.41 Million/uL      Hemoglobin 13.2 g/dL      Hematocrit 38.3 %      MCV 87 fL      MCH 29.9 pg      MCHC 34.5 g/dL      RDW 13.2 %      MPV 10.2 fL      Platelets 225 Thousands/uL     Narrative:      This is an appended report.  These results have been appended to a previously verified report.    Manual  Differential(PHLEBS Do Not Order) [[id number]]  (Abnormal) Collected: 08/01/24 2245    Lab Status: Final result Specimen: Blood from Arm, Right Updated: 08/01/24 2357     Segmented % 23 %      Bands % 7 %      Lymphocytes % 65 %      Monocytes % 2 %      Eosinophils % 0 %      Basophils % 0 %      Atypical Lymphocytes % 3 %      Absolute Neutrophils 3.75 Thousand/uL      Absolute Lymphocytes 8.50 Thousand/uL      Absolute Monocytes 0.25 Thousand/uL      Absolute Eosinophils 0.00 Thousand/uL      Absolute Basophils 0.00 Thousand/uL      Total Counted --     RBC Morphology Present     Platelet Estimate Adequate     Anisocytosis Present     Polychromasia Present    UA w Reflex to Microscopic w Reflex to Culture [[id number]]  (Abnormal) Collected: 08/01/24 2258    Lab Status: Final result Specimen: Urine, Clean Catch Updated: 08/01/24 2337     Color, UA Yellow     Clarity, UA Clear     Specific Gravity, UA 1.023     pH, UA 6.0     Leukocytes, UA Negative     Nitrite, UA Negative     Protein, UA 50 (1+) mg/dl      Glucose, UA Negative mg/dl      Ketones, UA 40 (2+) mg/dl      Urobilinogen, UA <2.0 mg/dl      Bilirubin, UA Negative     Occult Blood, UA Trace    Procalcitonin [[id number]]  (Abnormal) Collected: 08/01/24 2245    Lab Status: Final result Specimen: Blood from Arm, Right Updated: 08/01/24 2321     Procalcitonin 0.86 ng/ml     Protime-INR [[id number]]  (Abnormal) Collected: 08/01/24 2245    Lab Status: Final result Specimen: Blood from Arm, Right Updated: 08/01/24 2318     Protime 15.9 seconds      INR 1.20    Narrative:      INR Therapeutic Range    Indication                                             INR Range      Atrial Fibrillation                                               2.0-3.0  Hypercoagulable State                                    2.0.2.3  Left Ventricular Asist Device                            2.0-3.0  Mechanical Heart Valve                                  -    Aortic(with afib, MI, embolism,  HF, LA enlargement,    and/or coagulopathy)                                     2.0-3.0 (2.5-3.5)     Mitral                                                             2.5-3.5  Prosthetic/Bioprosthetic Heart Valve               2.0-3.0  Venous thromboembolism (VTE: VT, PE        2.0-3.0    APTT [[id number]]  (Normal) Collected: 08/01/24 2245    Lab Status: Final result Specimen: Blood from Arm, Right Updated: 08/01/24 2318     PTT 29 seconds     Comprehensive metabolic panel [[id number]]  (Abnormal) Collected: 08/01/24 2245    Lab Status: Final result Specimen: Blood from Arm, Right Updated: 08/01/24 2312     Sodium 128 mmol/L      Potassium 3.8 mmol/L      Chloride 96 mmol/L      CO2 24 mmol/L      ANION GAP 8 mmol/L      BUN 13 mg/dL      Creatinine 0.97 mg/dL      Glucose 92 mg/dL      Calcium 8.2 mg/dL      Corrected Calcium 8.8 mg/dL       U/L       U/L      Alkaline Phosphatase 55 U/L      Total Protein 6.9 g/dL      Albumin 3.3 g/dL      Total Bilirubin 0.87 mg/dL      eGFR 87 ml/min/1.73sq m     Narrative:      [employer] guidelines for Chronic Kidney Disease (CKD):     Stage 1 with normal or high GFR (GFR > 90 mL/min/1.73 square meters)    Stage 2 Mild CKD (GFR = 60-89 mL/min/1.73 square meters)    Stage 3A Moderate CKD (GFR = 45-59 mL/min/1.73 square meters)    Stage 3B Moderate CKD (GFR = 30-44 mL/min/1.73 square meters)    Stage 4 Severe CKD (GFR = 15-29 mL/min/1.73 square meters)    Stage 5 End Stage CKD (GFR <15 mL/min/1.73 square meters)  Note: GFR calculation is accurate only with a steady state creatinine    Lipase [[id number]]  (Normal) Collected: 08/01/24 2245    Lab Status: Final result Specimen: Blood from Arm, Right Updated: 08/01/24 2312     Lipase 25 u/L     Lactic acid [[id number]]  (Normal) Collected: 08/01/24 2245    Lab Status: Final result Specimen: Blood from Arm, Right Updated: 08/01/24 2310     LACTIC ACID 1.0 mmol/L     Narrative:      Result may be  elevated if tourniquet was used during collection.    Blood culture #1 [[id number]] Collected: 08/01/24 2245    Lab Status: In process Specimen: Blood from Arm, Left Updated: 08/01/24 2251    Blood culture #2 [[id number]] Collected: 08/01/24 2245    Lab Status: In process Specimen: Blood from Arm, Right Updated: 08/01/24 2251                   XR chest pa & lateral    (Results Pending)   CT abdomen pelvis with contrast    (Results Pending)              Procedures  ECG 12 Lead Documentation Only    Date/Time: 8/2/2024 12:45 AM    Performed by: Guanako Howe MD  Authorized by: Guanako Howe MD    ECG reviewed by me, the ED Provider: yes    Patient location:  ED  Previous ECG:     Previous ECG:  Unavailable  Interpretation:     Interpretation: normal    Quality:     Tracing quality:  Limited by artifact  Rate:     ECG rate:  92    ECG rate assessment: normal    Rhythm:     Rhythm: sinus rhythm    Ectopy:     Ectopy: none    QRS:     QRS axis:  Normal    QRS intervals:  Normal  Conduction:     Conduction: normal    ST segments:     ST segments:  Normal  T waves:     T waves: normal             ED Course            Initial Sepsis Screening       Row Name 08/02/24 0017                Is the patient's history suggestive of a new or worsening infection? Yes (Proceed)  -CN        Suspected source of infection acute abdominal infection  -CN        Indicate SIRS criteria Hyperthemia > 38.3C (100.9F) OR Hypothermia <36C (96.8F);Tachycardia > 90 bpm;Leukocytosis (WBC > 73260 IJL) OR Leukopenia (WBC <4000 IJL) OR Bandemia (WBC >10% bands)  -CN        Are two or more of the above signs & symptoms of infection both present and new to the patient? Yes (Proceed)  -CN        Assess for evidence of organ dysfunction: Are any of the below criteria present within 6 hours of suspected infection and SIRS criteria that are NOT considered to be chronic conditions? --                  User Key  (r) = Recorded By, (t) =  Taken By, (c) = Cosigned By      Initials Name Provider Type    SARINA Howe MD Physician                    SBIRT 22yo+      Flowsheet Row Most Recent Value   Initial Alcohol Screen: US AUDIT-C     1. How often do you have a drink containing alcohol? 1 Filed at: 08/01/2024 2304   2. How many drinks containing alcohol do you have on a typical day you are drinking?  1 Filed at: 08/01/2024 2304   3a. Male UNDER 65: How often do you have five or more drinks on one occasion? 1 Filed at: 08/01/2024 2304   Audit-C Score 3 Filed at: 08/01/2024 2304   ANTHONY: How many times in the past year have you...    Used an illegal drug or used a prescription medication for non-medical reasons? Never Filed at: 08/01/2024 2304                      Medical Decision Making  53-year-old male presents with multiple weeks of diarrhea, abdominal pain, fatigue, malaise, and today appears to have thrush, and denies any cough, shortness of breath, chest pain, headache, dizziness, no dysuria, no other complaints.  The patient will be evaluated for colitis, appendicitis, urinary tract infection, or other abnormalities.    2245  The patient is now running a fever, meets sepsis criteria, will be started on antibiotics will be admitted to the hospital for further management.    The patient care will be transferred to Dr. Guzman for further management, pending CT results.    Amount and/or Complexity of Data Reviewed  Labs: ordered.  Radiology: ordered.    Risk  Prescription drug management.                 Disposition  Final diagnoses:   Diarrhea   Acute sepsis (HCC)   Transaminitis     Time reflects when diagnosis was documented in both MDM as applicable and the Disposition within this note       Time User Action Codes Description Comment    8/2/2024 12:16 AM Guanako Howe [R19.7] Diarrhea     8/2/2024 12:16 AM Guanako Howe [A41.9] Acute sepsis (HCC)     8/2/2024 12:16 AM Guanako Howe [R74.01]  Transaminitis           ED Disposition       None          Follow-up Information    None         Patient's Medications    No medications on file       No discharge procedures on file.    PDMP Review       None            ED Provider  Electronically Signed by             Guanako Howe MD  08/02/24 0048

## 2024-08-02 NOTE — ASSESSMENT & PLAN NOTE
Presented with 3 weeks of persistent diarrhea, fever, night sweats after he ate undercooked chicken on 7/14  Seen by his PCP 7/25 and recommended had stool studies performed on 726 and was negative for Salmonella, Shigella and Campylobacter at that time.  Presented given continued symptoms.  CT A/P significant for liquid stool in the colon consistent with enteritis.   Fecal calprotectin and ova and parasite pending  GI consulted - recommended azithromycin.  Will complete third dose today prior to discharge  Outpatient colonoscopy in 6 to 8 weeks (last 15 years ago, overdue)  WBC count nathaly however patient is having formed bowel movements, no further GI symptoms. No fevers.  Outpt CBC

## 2024-08-02 NOTE — CONSULTS
Physician Requesting Consult: Holly Sneed MD    Reason for Consult / Principal Problem: Diarrhea    Assessment/Plan:  53-year-old male with no significant past medical history who presents to hospital with report of diarrhea associated with mild lower abdominal pain and fever which has been ongoing since 7/14.      1.  Diarrhea  Patient presented with report of ongoing diarrhea associated with mild lower abdominal pain and tenderness which started on 7/14 after eating undercooked chicken.  Patient did have stool for bacteria panel done as outpatient which was negative.  Patient reported when symptoms initially started he would go 6-7 times a day currently moving his bowels approximately 1-3 times a day which is liquid in nature.CT of abdomen and pelvis with contrast on 8/2 showed liquid stool in the colon, consider an enteritis in the appropriate clinical setting.  No evidence of bowel obstruction.  Symptoms most likely gastroenteritis will rule out underlying stool infection or parasite infection since symptoms initiated after eating undercooked chicken.    -Obtain stool for bacteria panel, C. difficile, Giardia, ova, and parasite  -Obtain fecal calprotectin  -Continue antibiotics   -Diet as tolerated  -Monitor CBC  -Pain management per attending  -Monitor serial abdominal exams  -Monitor stool output  -Recommend outpatient colonoscopy due to family history of colon cancer and no colonoscopy done in 15 years      HPI: Tevin Rebolledo is a 53 y.o. male  Sepsis (HCC).  This is a 53-year-old male with no significant past medical history who presents to hospital with report of diarrhea associated with mild lower abdominal pain and fever which has been ongoing since 7/14.  Patient noted all symptoms started on 7/14 after he ate undercooked chicken.  Patient reports he did have stool for bacteria panel done as outpatient which was negative for infection.  Patient's had no stool for Giardia, ova, and parasite or  C. difficile done as outpatient.  Patient reports when symptoms initially started he was moving his bowels approximately 6-7 times a day.  Patient reports now he moves his bowels anywhere from 1-3 times a day which is liquid and not associated with blood.  Patient does report some mild abdominal discomfort and pain with palpation in lower abdomen.  Patient denies acid reflux, heartburn, epigastric or upper abdominal pain, bright red blood in stool, bright red blood from rectal area, or black tarry stool.    CT of abdomen and pelvis with contrast on 8/2 showed liquid stool in the colon, consider an enteritis in the appropriate clinical setting.  No evidence of bowel obstruction.  Splenomegaly nonspecific.  Labs on admission positive leukocytosis, white blood count 12.50, white blood count 11.40 today.  Hemoglobin within normal limits.  Alk phos and total albumin within normal limits.  Lactic acid 1.0.  INR 1.20.  Blood cultures pending.    Patient denies any sick contacts or new medications prior to onset of symptoms.  Patient reports he drinks at least 3 beers once a month.  Patient denies tobacco use.  Patient denies illicit drug use or marijuana use.  Patient reports colonoscopy 15.  Positive family history colon cancer dad diagnosed at age 50.  No previous EGD.  Family history of stomach cancer.    Allergies: No Known Allergies    Medications:  Current Facility-Administered Medications:     acetaminophen (TYLENOL) tablet 650 mg, 650 mg, Oral, Q6H PRN, Bethany Arboleda PA-C    azithromycin (ZITHROMAX) tablet 500 mg, 500 mg, Oral, Q24H, Holly Sneed MD    enoxaparin (LOVENOX) subcutaneous injection 40 mg, 40 mg, Subcutaneous, Daily, Bethany Arboleda PA-C, 40 mg at 08/02/24 0920    nystatin (MYCOSTATIN) oral suspension 500,000 Units, 500,000 Units, Swish & Swallow, 4x Daily, Bethany Arboleda PA-C, 500,000 Units at 08/02/24 0919    sodium chloride 0.9 % infusion, 100 mL/hr, Intravenous, Continuous,  Bethany Arboleda PA-C, Last Rate: 100 mL/hr at 08/02/24 0609, 100 mL/hr at 08/02/24 0609  No current outpatient medications on file.    Past Medical history:History reviewed. No pertinent past medical history.    Past Surgical History: History reviewed. No pertinent surgical history.    Social history:   Social History     Tobacco Use    Smoking status: Never     Passive exposure: Never    Smokeless tobacco: Never   Substance Use Topics    Alcohol use: Not Currently     Alcohol/week: 1.0 standard drink of alcohol     Types: 1 Cans of beer per week    Drug use: Not Currently       Family history: History reviewed. No pertinent family history.     Review of Systems: Review of Systems   Constitutional:  Positive for chills, fatigue and fever.   Gastrointestinal:  Positive for abdominal pain and diarrhea.   All other systems reviewed and are negative.      Physical Exam: Physical Exam  Vitals and nursing note reviewed.   Constitutional:       General: He is not in acute distress.  HENT:      Head: Normocephalic and atraumatic.   Cardiovascular:      Rate and Rhythm: Normal rate and regular rhythm.      Pulses: Normal pulses.      Heart sounds: Normal heart sounds.   Pulmonary:      Effort: Pulmonary effort is normal. No respiratory distress.      Breath sounds: Normal breath sounds. No stridor. No wheezing, rhonchi or rales.   Abdominal:      General: Bowel sounds are normal. There is no distension.      Palpations: Abdomen is soft. There is no mass.      Tenderness: There is no abdominal tenderness. There is no guarding or rebound.      Hernia: No hernia is present.   Musculoskeletal:      Cervical back: Normal range of motion and neck supple.      Right lower leg: No edema.      Left lower leg: No edema.   Skin:     General: Skin is warm and dry.      Coloration: Skin is not jaundiced or pale.   Neurological:      Mental Status: He is alert and oriented to person, place, and time.   Psychiatric:         Mood and  Affect: Mood normal.           Lab Results:   Recent Results (from the past 24 hour(s))   CBC and differential    Collection Time: 08/01/24 10:45 PM   Result Value Ref Range    WBC 12.50 (H) 4.31 - 10.16 Thousand/uL    RBC 4.41 3.88 - 5.62 Million/uL    Hemoglobin 13.2 12.0 - 17.0 g/dL    Hematocrit 38.3 36.5 - 49.3 %    MCV 87 82 - 98 fL    MCH 29.9 26.8 - 34.3 pg    MCHC 34.5 31.4 - 37.4 g/dL    RDW 13.2 11.6 - 15.1 %    MPV 10.2 8.9 - 12.7 fL    Platelets 225 149 - 390 Thousands/uL   Comprehensive metabolic panel    Collection Time: 08/01/24 10:45 PM   Result Value Ref Range    Sodium 128 (L) 135 - 147 mmol/L    Potassium 3.8 3.5 - 5.3 mmol/L    Chloride 96 96 - 108 mmol/L    CO2 24 21 - 32 mmol/L    ANION GAP 8 4 - 13 mmol/L    BUN 13 5 - 25 mg/dL    Creatinine 0.97 0.60 - 1.30 mg/dL    Glucose 92 65 - 140 mg/dL    Calcium 8.2 (L) 8.4 - 10.2 mg/dL    Corrected Calcium 8.8 8.3 - 10.1 mg/dL     (H) 13 - 39 U/L     (H) 7 - 52 U/L    Alkaline Phosphatase 55 34 - 104 U/L    Total Protein 6.9 6.4 - 8.4 g/dL    Albumin 3.3 (L) 3.5 - 5.0 g/dL    Total Bilirubin 0.87 0.20 - 1.00 mg/dL    eGFR 87 ml/min/1.73sq m   Lactic acid    Collection Time: 08/01/24 10:45 PM   Result Value Ref Range    LACTIC ACID 1.0 0.5 - 2.0 mmol/L   Procalcitonin    Collection Time: 08/01/24 10:45 PM   Result Value Ref Range    Procalcitonin 0.86 (H) <=0.25 ng/ml   Protime-INR    Collection Time: 08/01/24 10:45 PM   Result Value Ref Range    Protime 15.9 (H) 12.3 - 15.0 seconds    INR 1.20 (H) 0.85 - 1.19   APTT    Collection Time: 08/01/24 10:45 PM   Result Value Ref Range    PTT 29 23 - 34 seconds   Lipase    Collection Time: 08/01/24 10:45 PM   Result Value Ref Range    Lipase 25 11 - 82 u/L   Manual Differential(PHLEBS Do Not Order)    Collection Time: 08/01/24 10:45 PM   Result Value Ref Range    Segmented % 23 (L) 43 - 75 %    Bands % 7 0 - 8 %    Lymphocytes % 65 (H) 14 - 44 %    Monocytes % 2 (L) 4 - 12 %    Eosinophils % 0  0 - 6 %    Basophils % 0 0 - 1 %    Atypical Lymphocytes % 3 (H) <=0 %    Absolute Neutrophils 3.75 1.85 - 7.62 Thousand/uL    Absolute Lymphocytes 8.50 (H) 0.60 - 4.47 Thousand/uL    Absolute Monocytes 0.25 0.00 - 1.22 Thousand/uL    Absolute Eosinophils 0.00 0.00 - 0.40 Thousand/uL    Absolute Basophils 0.00 0.00 - 0.10 Thousand/uL    Total Counted      RBC Morphology Present     Platelet Estimate Adequate Adequate    Anisocytosis Present     Polychromasia Present    UA w Reflex to Microscopic w Reflex to Culture    Collection Time: 08/01/24 10:58 PM    Specimen: Urine, Clean Catch   Result Value Ref Range    Color, UA Yellow     Clarity, UA Clear     Specific Gravity, UA 1.023 1.003 - 1.030    pH, UA 6.0 4.5, 5.0, 5.5, 6.0, 6.5, 7.0, 7.5, 8.0    Leukocytes, UA Negative Negative    Nitrite, UA Negative Negative    Protein, UA 50 (1+) (A) Negative mg/dl    Glucose, UA Negative Negative mg/dl    Ketones, UA 40 (2+) (A) Negative mg/dl    Urobilinogen, UA <2.0 <2.0 mg/dl mg/dl    Bilirubin, UA Negative Negative    Occult Blood, UA Trace (A) Negative   Urine Microscopic    Collection Time: 08/01/24 10:58 PM   Result Value Ref Range    RBC, UA None Seen None Seen, 1-2 /hpf    WBC, UA 4-10 (A) None Seen, 1-2 /hpf    Epithelial Cells None Seen None Seen, Occasional /hpf    Bacteria, UA None Seen None Seen, Occasional /hpf    MUCUS THREADS Occasional (A) None Seen   ECG 12 lead    Collection Time: 08/01/24 11:05 PM   Result Value Ref Range    Ventricular Rate 92 BPM    Atrial Rate 92 BPM    ID Interval 130 ms    QRSD Interval 90 ms    QT Interval 364 ms    QTC Interval 450 ms    P [place] 27 degrees    QRS Axis 81 degrees    T Wave Axis 31 degrees   Clostridium difficile toxin by PCR with EIA    Collection Time: 08/02/24  3:24 AM    Specimen: Stool   Result Value Ref Range    C.difficile toxin by PCR Negative Negative   Stool Enteric Bacterial Panel by PCR    Collection Time: 08/02/24  3:24 AM    Specimen: Stool   Result  Value Ref Range    Salmonella sp PCR Negative Negative    Shigella sp/Enteroinvasive E. coli (EIEC) PCR Negative Negative    Campylobacter sp (jejuni and coli) PCR Positive (A) Negative    Shiga toxin 1/Shiga toxin 2 genes PCR Negative Negative   Procalcitonin, Next Day AM Collection    Collection Time: 08/02/24  6:09 AM   Result Value Ref Range    Procalcitonin 0.86 (H) <=0.25 ng/ml   Basic metabolic panel    Collection Time: 08/02/24  6:09 AM   Result Value Ref Range    Sodium 132 (L) 135 - 147 mmol/L    Potassium 3.2 (L) 3.5 - 5.3 mmol/L    Chloride 104 96 - 108 mmol/L    CO2 23 21 - 32 mmol/L    ANION GAP 5 4 - 13 mmol/L    BUN 10 5 - 25 mg/dL    Creatinine 0.85 0.60 - 1.30 mg/dL    Glucose 82 65 - 140 mg/dL    Calcium 6.9 (L) 8.4 - 10.2 mg/dL    eGFR 98 ml/min/1.73sq m   CBC and differential    Collection Time: 08/02/24  6:09 AM   Result Value Ref Range    WBC 11.40 (H) 4.31 - 10.16 Thousand/uL    RBC 4.08 3.88 - 5.62 Million/uL    Hemoglobin 12.0 12.0 - 17.0 g/dL    Hematocrit 35.4 (L) 36.5 - 49.3 %    MCV 87 82 - 98 fL    MCH 29.4 26.8 - 34.3 pg    MCHC 33.9 31.4 - 37.4 g/dL    RDW 13.4 11.6 - 15.1 %    MPV 10.2 8.9 - 12.7 fL    Platelets 203 149 - 390 Thousands/uL   Magnesium    Collection Time: 08/02/24  6:09 AM   Result Value Ref Range    Magnesium 2.1 1.9 - 2.7 mg/dL   Manual Differential(PHLEBS Do Not Order)    Collection Time: 08/02/24  6:09 AM   Result Value Ref Range    Segmented % 42 (L) 43 - 75 %    Bands % 5 0 - 8 %    Lymphocytes % 44 14 - 44 %    Monocytes % 5 4 - 12 %    Eosinophils % 0 0 - 6 %    Basophils % 1 0 - 1 %    Atypical Lymphocytes % 3 (H) <=0 %    Absolute Neutrophils 5.36 1.85 - 7.62 Thousand/uL    Absolute Lymphocytes 5.36 (H) 0.60 - 4.47 Thousand/uL    Absolute Monocytes 0.57 0.00 - 1.22 Thousand/uL    Absolute Eosinophils 0.00 0.00 - 0.40 Thousand/uL    Absolute Basophils 0.11 (H) 0.00 - 0.10 Thousand/uL    Total Counted      Smudge Cells Present     RBC Morphology Normal      Platelet Estimate Adequate Adequate           Imaging Studies: XR chest pa & lateral    Result Date: 8/2/2024  Narrative: XR CHEST PA & LATERAL INDICATION: Fever. COMPARISON: CXR 12/19/2013, abdomen CT 8/2/2024. FINDINGS: Clear lungs. No pneumothorax or pleural effusion. Normal cardiomediastinal silhouette. Bones are unremarkable for age. Normal upper abdomen.     Impression: No acute cardiopulmonary disease. Workstation performed: [id number]     CT abdomen pelvis with contrast    Result Date: 8/2/2024  Narrative: CT ABDOMEN AND PELVIS WITH IV CONTRAST INDICATION: BLQ abd pain. COMPARISON: None. TECHNIQUE: CT examination of the abdomen and pelvis was performed. Multiplanar 2D reformatted images were created from the source data. This examination, like all CT scans performed in the [facility], was performed utilizing techniques to minimize radiation dose exposure, including the use of iterative reconstruction and automated exposure control. Radiation dose length product (DLP) for this visit: 1121 mGy-cm IV Contrast: 90 mL of iohexol (OMNIPAQUE) Enteric Contrast: Not administered. FINDINGS: ABDOMEN LOWER CHEST: Bibasilar atelectasis. LIVER/BILIARY TREE: Unremarkable. GALLBLADDER: No calcified gallstones. No pericholecystic inflammatory change. SPLEEN: Enlarged spleen, splenic volume estimated at 800 mL, nonspecific PANCREAS: Unremarkable. ADRENAL GLANDS: Unremarkable. KIDNEYS/URETERS: No hydronephrosis or urinary tract calculi. Subcentimeter hypoattenuating renal lesion(s), too small to characterize but statistically likely benign, which do not warrant follow-up (Radiology June 2019). STOMACH AND BOWEL: Liquid stool in the colon, consider an enteritis in the appropriate clinical setting. No evidence of bowel obstruction. Otherwise grossly unremarkable. APPENDIX: No findings to suggest appendicitis. ABDOMINOPELVIC CAVITY: No ascites. No pneumoperitoneum. No lymphadenopathy. VESSELS: Mild  "atherosclerosis, no aortic aneurysm. PELVIS REPRODUCTIVE ORGANS: Unremarkable for patient's age. URINARY BLADDER: Unremarkable. ABDOMINAL WALL/INGUINAL REGIONS: Small fat-containing left inguinal hernia. BONES: Multilevel degenerative changes of the spine, most prominently involving the lumbar facets. No acute fracture or suspicious osseous lesion.     Impression: Liquid stool in the colon, consider an enteritis in the appropriate clinical setting. No evidence of bowel obstruction. Splenomegaly, nonspecific. Other findings as above. Workstation performed: [id number]       Problem List:   Patient Active Problem List   Diagnosis    Sepsis (HCC)    Diarrhea of presumed infectious origin    Splenomegaly    Oral thrush    Hyponatremia         ABDIEL Trivedi      Please Note: \"This note has been constructed using a voice recognition system.Therefore there may be syntax, spelling, and/or grammatical errors. Please call if you have any questions. \"**   "

## 2024-08-02 NOTE — ASSESSMENT & PLAN NOTE
Sodium 128 on admission, likely secondary to GI losses  Will replete with IV fluids and repeat BMP  If there is minimal response we will proceed with further hyponatremia workup

## 2024-08-02 NOTE — ASSESSMENT & PLAN NOTE
CT A/P shows nonspecific splenomegaly, estimated volume 800 mL  Patient made aware labs grossly normal, inflammation likely secondary to persistent diarrhea  Can follow-up as an outpatient

## 2024-08-02 NOTE — ASSESSMENT & PLAN NOTE
SIRS criteria met prior with leukocytosis and tachycardia  Source: campylobacter enteritis  CT A/P.  Significant for enteritis   Status post Vanco and Zosyn in the emergency department, Campylobacter returned positive and transition to azithromycin.  Status post 3 doses total.  WBC did rise to 18 on 8/4/2024 however patient having formed bowel movements, no GI symptoms at this time  No fever or chills  Does have nose lesions over the last 1-2 days which he has had intermittently for the last 2 years and for which he takes valtrex and use PRN ointments (as recommended by derm). This could be contributing to his WBC  Discussed need for outpatient laboratory studies to ensure stability/improvement in WBC

## 2024-08-02 NOTE — ASSESSMENT & PLAN NOTE
SIRS criteria met with leukocytosis and tachycardia  Source: Infectious diarrhea  Labs:  WBC: 12.5  Lactic 1.0  Pro-Spencer 0.86, repeat in the a.m.  Imaging  CT A/P.  Significant for enteritis and splenomegaly  Noted on IV vancomycin and Zosyn in the ED, will continue pending stool cultures  C. difficile and stool enteric panel pending  Continue IV fluids

## 2024-08-02 NOTE — ED CARE HANDOFF
Emergency Department Sign Out Note        Sign out and transfer of care from Dr Howe. See Separate Emergency Department note.     The patient, Tevin Rbeolledo, was evaluated by the previous provider for n/v/d.    Workup Completed:  Lab work concerning for sepsis    ED Course / Workup Pending (followup):  Awaiting CT results  CT shows liquid stool in the colon consider enteritis.  No evidence of bowel obstruction.  Will admit the patient for IV antibiotics further evaluation and treatment.                                     Procedures  Medical Decision Making  Amount and/or Complexity of Data Reviewed  Labs: ordered.  Radiology: ordered.    Risk  Prescription drug management.  Decision regarding hospitalization.            Disposition  Final diagnoses:   Diarrhea   Acute sepsis (HCC)   Transaminitis     Time reflects when diagnosis was documented in both MDM as applicable and the Disposition within this note       Time User Action Codes Description Comment    8/2/2024 12:16 AM Guanako Howe [R19.7] Diarrhea     8/2/2024 12:16 AM Guanako Howe [A41.9] Acute sepsis (HCC)     8/2/2024 12:16 AM Guanako Howe [R74.01] Transaminitis           ED Disposition       None          Follow-up Information    None       Patient's Medications    No medications on file     No discharge procedures on file.       ED Provider  Electronically Signed by     India Guzman DO  08/05/24 4413

## 2024-08-02 NOTE — SEPSIS NOTE
Sepsis Note   Tevin Rebolledo 53 y.o. male MRN: [id number]  Unit/Bed#: ED-04 Encounter: [id number]       Initial Sepsis Screening       Row Name 08/02/24 0017                Is the patient's history suggestive of a new or worsening infection? Yes (Proceed)  -CN        Suspected source of infection acute abdominal infection  -CN        Indicate SIRS criteria Hyperthemia > 38.3C (100.9F) OR Hypothermia <36C (96.8F);Tachycardia > 90 bpm;Leukocytosis (WBC > 08395 IJL) OR Leukopenia (WBC <4000 IJL) OR Bandemia (WBC >10% bands)  -CN        Are two or more of the above signs & symptoms of infection both present and new to the patient? Yes (Proceed)  -CN        Assess for evidence of organ dysfunction: Are any of the below criteria present within 6 hours of suspected infection and SIRS criteria that are NOT considered to be chronic conditions? --                  User Key  (r) = Recorded By, (t) = Taken By, (c) = Cosigned By      Initials Name Provider Type    CN Guanako Howe MD Physician                        Body mass index is 33.23 kg/m².  Wt Readings from Last 1 Encounters:   08/01/24 102 kg (225 lb)        Ideal body weight: 70.7 kg (155 lb 13.8 oz)  Adjusted ideal body weight: 83.2 kg (183 lb 8.3 oz)

## 2024-08-02 NOTE — H&P
[facility]  H&P  Name: Tevin Rebolledo 53 y.o. male I MRN: [id number]  Unit/Bed#: ED-04 I Date of Admission: 8/1/2024   Date of Service: 8/2/2024 I Hospital Day: 0      Assessment & Plan   * Sepsis (HCC)  Assessment & Plan  SIRS criteria met with leukocytosis and tachycardia  Source: Infectious diarrhea  Labs:  WBC: 12.5  Lactic 1.0  Pro-Spencer 0.86, repeat in the a.m.  Imaging  CT A/P.  Significant for enteritis and splenomegaly  Noted on IV vancomycin and Zosyn in the ED, will continue pending stool cultures  C. difficile and stool enteric panel pending  Continue IV fluids    Diarrhea of presumed infectious origin  Assessment & Plan  Presenting with 3 weeks of persistent diarrhea after he ate some undercooked chicken on 7/14.  Associated with intermittent lower abdominal pain, fever and night sweats.  Denies recent travel, sick contacts, history of IBD/IBS, recent antibiotic use  Seen by his PCP who ordered stool studies which were negative for any infectious diarrheal causes  CT A/P significant for liquid stool in the colon consistent with enteritis.  Negative for bowel obstruction  Fam Hx significant for colon cancer, biological father dx at age 50.   Pt had colonoscopy done 15 years ago which was grossly normal, is overdue for repeat   Fecal calprotectin ordered  GI consulted, recommendations pending  C. difficile and stool enteric panel ordered, pending  Continue IV antibiotics Zosyn pending cultures    Hyponatremia  Assessment & Plan  Sodium 128 on admission, likely secondary to GI losses  Will replete with IV fluids and repeat BMP  If there is minimal response we will proceed with further hyponatremia workup    Oral thrush  Assessment & Plan  Mild white film noted on the tongue, pt c/o dry mouth sensation.   Continue nystatin swish and swallow     Splenomegaly  Assessment & Plan  CT A/P shows nonspecific splenomegaly, estimated volume 800 mL  Appreciate GI input    VTE Pharmacologic  Prophylaxis: VTE Score: 3 Moderate Risk (Score 3-4) - Pharmacological DVT Prophylaxis Ordered: enoxaparin (Lovenox).  Code Status: Level 1 - Full Code per patient  Discussion with family: Patient declined call to .     Anticipated Length of Stay: Patient will be admitted on an observation basis with an anticipated length of stay of less than 2 midnights secondary to persistent diarrhea and hyponatremia.    Total Time Spent on Date of Encounter in care of patient: 75 mins. This time was spent on one or more of the following: performing physical exam; counseling and coordination of care; obtaining or reviewing history; documenting in the medical record; reviewing/ordering tests, medications or procedures; communicating with other healthcare professionals and discussing with patient's family/caregivers.    Chief Complaint: Diarrhea x 3    History of Present Illness:  Tevin Rebolledo is a 53 y.o. male with no PMH who presents with 3 weeks of persistent yellow, nonbloody diarrhea with associated mild lower abdominal pain that is worse with palpation, fever, night sweats.  The patient notes all the symptoms started on 7/14 when he ate some undercooked chicken.  He denies any tenesmus, nausea/vomiting, lightheadedness, dizziness, headache, abdominal cramping, rashes, peripheral edema.  He denies any recent antibiotic use, travel, sick contacts, history of IBD/IBS.  He does report a past medical history of colon cancer, his dad was diagnosed at age 50 patient had prior colonoscopy which were grossly normal.    Review of Systems:  Review of Systems   Constitutional:  Positive for chills, diaphoresis, fatigue and fever.   HENT:  Negative for sore throat.         Dry mouth   Respiratory:  Negative for cough and shortness of breath.    Cardiovascular:  Negative for chest pain, palpitations and leg swelling.   Gastrointestinal:  Positive for abdominal pain and diarrhea. Negative for abdominal distention, blood in  stool, constipation, nausea and vomiting.   Genitourinary:  Negative for dysuria, flank pain and hematuria.   Skin:  Negative for rash.   Neurological:  Negative for dizziness, weakness, light-headedness and headaches.       Past Medical and Surgical History:   History reviewed. No pertinent past medical history.    History reviewed. No pertinent surgical history.    Meds/Allergies:  Prior to Admission medications    Not on File     I have reviewed home medications with patient personally.    Allergies: No Known Allergies    Social History:  Marital Status: Single   Occupation: [employer]  Patient Pre-hospital Living Situation: Home  Patient Pre-hospital Level of Mobility: walks  Patient Pre-hospital Diet Restrictions: None  Substance Use History:   Social History     Substance and Sexual Activity   Alcohol Use Not Currently    Alcohol/week: 1.0 standard drink of alcohol    Types: 1 Cans of beer per week     Social History     Tobacco Use   Smoking Status Never    Passive exposure: Never   Smokeless Tobacco Never     Social History     Substance and Sexual Activity   Drug Use Not Currently       Family History:  History reviewed. No pertinent family history.    Physical Exam:     Vitals:   Blood Pressure: 118/66 (08/02/24 0301)  Pulse: 84 (08/02/24 0301)  Temperature: 99.4 °F (37.4 °C) (08/02/24 0301)  Temp Source: Oral (08/02/24 0301)  Respirations: 18 (08/02/24 0301)  Weight - Scale: 102 kg (225 lb) (08/01/24 2314)  SpO2: 95 % (08/02/24 0301)    Physical Exam  Vitals reviewed.   Constitutional:       Appearance: Normal appearance. He is not ill-appearing.   HENT:      Mouth/Throat:      Mouth: Mucous membranes are dry.      Comments: White film covering the tongue  Eyes:      Conjunctiva/sclera: Conjunctivae normal.   Cardiovascular:      Rate and Rhythm: Normal rate and regular rhythm.      Heart sounds: Normal heart sounds.   Pulmonary:      Effort: Pulmonary effort is normal.      Breath sounds: Normal breath  "sounds.   Abdominal:      General: Abdomen is flat. Bowel sounds are normal. There is no distension.      Palpations: Abdomen is soft. There is no mass.      Tenderness: There is abdominal tenderness.   Musculoskeletal:      Right lower leg: No edema.      Left lower leg: No edema.   Skin:     General: Skin is warm and dry.      Coloration: Skin is not jaundiced.   Neurological:      Mental Status: He is alert and oriented to person, place, and time.         Additional Data:     Lab Results:  Results from last 7 days   Lab Units 08/01/24  2245   WBC Thousand/uL 12.50*   HEMOGLOBIN g/dL 13.2   HEMATOCRIT % 38.3   PLATELETS Thousands/uL 225   BANDS PCT % 7   LYMPHO PCT % 65*   MONO PCT % 2*   EOS PCT % 0     Results from last 7 days   Lab Units 08/01/24  2245   SODIUM mmol/L 128*   POTASSIUM mmol/L 3.8   CHLORIDE mmol/L 96   CO2 mmol/L 24   BUN mg/dL 13   CREATININE mg/dL 0.97   ANION GAP mmol/L 8   CALCIUM mg/dL 8.2*   ALBUMIN g/dL 3.3*   TOTAL BILIRUBIN mg/dL 0.87   ALK PHOS U/L 55   ALT U/L 165*   AST U/L 119*   GLUCOSE RANDOM mg/dL 92     Results from last 7 days   Lab Units 08/01/24  2245   INR  1.20*         No results found for: \"HGBA1C\"  Results from last 7 days   Lab Units 08/01/24  2245   LACTIC ACID mmol/L 1.0   PROCALCITONIN ng/ml 0.86*       Lines/Drains:  Invasive Devices       Peripheral Intravenous Line  Duration             Peripheral IV 08/01/24 Right Antecubital <1 day                        Imaging: Reviewed radiology reports from this admission including: abdominal/pelvic CT  CT abdomen pelvis with contrast   Final Result by Momo Preston DO (08/02 0214)      Liquid stool in the colon, consider an enteritis in the appropriate clinical setting. No evidence of bowel obstruction.      Splenomegaly, nonspecific.      Other findings as above.         Workstation performed: [id number]         XR chest pa & lateral    (Results Pending)       EKG and Other Studies Reviewed on Admission:   EKG: " NSR. HR 92.    ** Please Note: This note has been constructed using a voice recognition system. **

## 2024-08-03 LAB
ALBUMIN SERPL BCG-MCNC: 2.7 G/DL (ref 3.5–5)
ALP SERPL-CCNC: 49 U/L (ref 34–104)
ALT SERPL W P-5'-P-CCNC: 146 U/L (ref 7–52)
ANION GAP SERPL CALCULATED.3IONS-SCNC: 3 MMOL/L (ref 4–13)
AST SERPL W P-5'-P-CCNC: 121 U/L (ref 13–39)
BILIRUB DIRECT SERPL-MCNC: 0.14 MG/DL (ref 0–0.2)
BILIRUB SERPL-MCNC: 0.63 MG/DL (ref 0.2–1)
BUN SERPL-MCNC: 7 MG/DL (ref 5–25)
CALCIUM SERPL-MCNC: 7.8 MG/DL (ref 8.4–10.2)
CHLORIDE SERPL-SCNC: 103 MMOL/L (ref 96–108)
CO2 SERPL-SCNC: 26 MMOL/L (ref 21–32)
CREAT SERPL-MCNC: 0.77 MG/DL (ref 0.6–1.3)
GFR SERPL CREATININE-BSD FRML MDRD: 102 ML/MIN/1.73SQ M
GLUCOSE SERPL-MCNC: 100 MG/DL (ref 65–140)
POTASSIUM SERPL-SCNC: 3.5 MMOL/L (ref 3.5–5.3)
PROT SERPL-MCNC: 5.9 G/DL (ref 6.4–8.4)
SODIUM SERPL-SCNC: 132 MMOL/L (ref 135–147)

## 2024-08-03 PROCEDURE — 80076 HEPATIC FUNCTION PANEL: CPT | Performed by: INTERNAL MEDICINE

## 2024-08-03 PROCEDURE — 99232 SBSQ HOSP IP/OBS MODERATE 35: CPT

## 2024-08-03 PROCEDURE — 80048 BASIC METABOLIC PNL TOTAL CA: CPT

## 2024-08-03 RX ADMIN — SODIUM CHLORIDE, SODIUM LACTATE, POTASSIUM CHLORIDE, AND CALCIUM CHLORIDE 125 ML/HR: .6; .31; .03; .02 INJECTION, SOLUTION INTRAVENOUS at 07:17

## 2024-08-03 RX ADMIN — NYSTATIN 500000 UNITS: 100000 SUSPENSION ORAL at 21:19

## 2024-08-03 RX ADMIN — NYSTATIN 500000 UNITS: 100000 SUSPENSION ORAL at 08:00

## 2024-08-03 RX ADMIN — NYSTATIN 500000 UNITS: 100000 SUSPENSION ORAL at 12:16

## 2024-08-03 RX ADMIN — AZITHROMYCIN DIHYDRATE 500 MG: 250 TABLET ORAL at 12:16

## 2024-08-03 RX ADMIN — NYSTATIN 500000 UNITS: 100000 SUSPENSION ORAL at 17:25

## 2024-08-03 RX ADMIN — ENOXAPARIN SODIUM 40 MG: 40 INJECTION SUBCUTANEOUS at 08:00

## 2024-08-03 RX ADMIN — SODIUM CHLORIDE, SODIUM LACTATE, POTASSIUM CHLORIDE, AND CALCIUM CHLORIDE 125 ML/HR: .6; .31; .03; .02 INJECTION, SOLUTION INTRAVENOUS at 00:20

## 2024-08-03 NOTE — UTILIZATION REVIEW
Initial Clinical Review    Admission: Date/Time/Statement:   Admission Orders (From admission, onward)       Ordered        08/02/24 0253  Place in Observation  Once                          Orders Placed This Encounter   Procedures    Place in Observation     Standing Status:   Standing     Number of Occurrences:   1     Order Specific Question:   Level of Care     Answer:   Med Surg [16]     ED Arrival Information       Expected   -    Arrival   8/1/2024 20:29    Acuity   Urgent              Means of arrival   Walk-In    Escorted by   Self    Service   Hospitalist    Admission type   Emergency              Arrival complaint   Fever / Abd Pain / Diarrhea / Abnormal Labs             Chief Complaint   Patient presents with    Abdominal Pain     States on 7/14 ate some undercooked chicken, had diarrhea, fever, night sweats since. Has sinus discharge that he states is purulent. Has not had solid BM in 3 weeks. Has abdominal pain starting this week.        Initial Presentation: 53 y.o. male to ED from home admitted observation d/t Sepsis secondary to Campylobacter enteritis. Intractable diarrhea.  3 weeks of persistent yellow, nonbloody diarrhea with associated mild lower abdominal pain that is worse with palpation, fever, night sweats.  The patient notes all the symptoms started on 7/14 when he ate some undercooked chicken.  WBC 12.50. . CA 8.2.. . CT A/P. Significant for enteritis and splenomegaly. Azithromycin.IVF.GI consult.    Anticipated Length of Stay/Certification Statement: Patient will be admitted on an Observation basis with an anticipated length of stay of  < 2 midnights.   Justification for Hospital Stay: Campylobacter Enteritis.     8/2 GI consult:Acute diarrhea: Secondary to Campylobacter enteritis  -Transition to azithromycin  -Low residue diet as tolerated  -Outpatient colonoscopy in 6 to 8 weeks to exclude luminal pathology     2.  Elevated LFTs: Most likely secondary to  Campylobacter infection  -Repeat LFTs in the morning  -If persistently elevated, consider outpatient serologic workup and ultrasound    8/3: continued diarrhea, hyponatremia.had 4 bowel movements overnight. He states this was more than he has been having but he had a lot to eat for dinner last night. He is drinking lots of fluids to stay hydrated. Discussed his low sodium levels and he plans to put extra salt on his food.  denies pain at this time. C. difficile negative. WBC 18.22. . CA 7.9. , .Elevated LFTs most likely secondary to Campylobacter infection. D/C IVF.Azithromycin.Outpatient colonoscopy in 6 to 8 weeks       ED Triage Vitals   Temperature Pulse Respirations Blood Pressure SpO2 Pain Score   08/01/24 2035 08/01/24 2035 08/01/24 2035 08/01/24 2035 08/01/24 2035 08/01/24 2255   99.8 °F (37.7 °C) (!) 109 18 137/84 95 % 5     Weight (last 2 days)       None            Vital Signs (last 3 days)       Date/Time Temp Pulse Resp BP MAP (mmHg) SpO2 O2 Device Patient Position - Orthostatic VS Trey Coma Scale Score Pain    08/04/24 0804 -- -- -- -- -- -- -- -- 15 --    08/04/24 0759 97.4 °F (36.3 °C) 99 20 129/81 -- 94 % None (Room air) Sitting -- No Pain    08/03/24 2330 -- -- -- -- -- -- None (Room air) -- 15 No Pain    08/03/24 22:16:27 -- 86 -- 118/73 88 93 % -- -- -- --    08/03/24 14:57:05 97.6 °F (36.4 °C) 88 16 122/68 86 94 % None (Room air) Sitting -- --    08/03/24 1200 -- -- -- -- -- -- None (Room air) -- 15 No Pain    08/03/24 07:09:31 98.3 °F (36.8 °C) 84 -- 118/69 85 93 % -- -- -- --    08/03/24 0030 -- -- -- -- -- 92 % None (Room air) -- 15 No Pain    08/02/24 21:09:52 99.7 °F (37.6 °C) 87 15 127/75 92 94 % None (Room air) Lying -- --    08/02/24 1600 -- -- -- -- -- -- -- -- 15 No Pain    08/02/24 15:53:53 98.4 °F (36.9 °C) 92 -- 113/69 84 93 % -- -- -- --    08/02/24 1505 -- -- -- -- -- -- -- -- -- No Pain    08/02/24 0930 -- 87 -- 123/58 82 93 % -- -- 15 --    08/02/24 0643  -- 83 18 116/55 -- 94 % -- -- -- --    08/02/24 0301 99.4 °F (37.4 °C) 84 18 118/66 -- 95 % None (Room air) Sitting -- --    08/02/24 0030 99.3 °F (37.4 °C) -- -- -- -- -- -- -- -- --    08/02/24 0000 -- 88 16 118/61 -- 96 % None (Room air) -- -- --    08/01/24 2310 -- -- -- -- -- -- None (Room air) -- 15 --    08/01/24 2255 -- 92 18 123/69 -- 95 % None (Room air) -- -- 5    08/01/24 2244 100.8 °F (38.2 °C) -- -- -- -- -- -- -- -- --    08/01/24 2035 99.8 °F (37.7 °C) 109 18 137/84 -- 95 % None (Room air) Lying -- --              Pertinent Labs/Diagnostic Test Results:   Radiology:  CT abdomen pelvis with contrast   Final Interpretation by Momo Preston DO (08/02 0214)      Liquid stool in the colon, consider an enteritis in the appropriate clinical setting. No evidence of bowel obstruction.      Splenomegaly, nonspecific.      Other findings as above.         Workstation performed: [id number]         XR chest pa & lateral   Final Interpretation by Shannon Roberto MD (08/02 0609)      No acute cardiopulmonary disease.            Workstation performed: [id number]           Cardiology:  Date/Time: 8/2/2024 12:45 AM     Performed by: Guanako Howe MD   Authorized by: Guanako Howe MD    ECG reviewed by me, the ED Provider: yes    Patient location:  ED   Previous ECG:     Previous ECG:  Unavailable   Interpretation:     Interpretation: normal    Quality:     Tracing quality:  Limited by artifact   Rate:     ECG rate:  92     ECG rate assessment: normal    Rhythm:     Rhythm: sinus rhythm    Ectopy:     Ectopy: none    QRS:     QRS axis:  Normal     QRS intervals:  Normal   Conduction:     Conduction: normal    ST segments:     ST segments:  Normal   T waves:     T waves: normal         GI:  No orders to display           Results from last 7 days   Lab Units 08/04/24  0549 08/02/24  0609 08/01/24  2245   WBC Thousand/uL 18.22* 11.40* 12.50*   HEMOGLOBIN g/dL 13.5 12.0 13.2    HEMATOCRIT % 40.5 35.4* 38.3   PLATELETS Thousands/uL 231 203 225   BANDS PCT %  --  5 7         Results from last 7 days   Lab Units 08/04/24  0549 08/03/24  0445 08/02/24  0609 08/01/24  2245   SODIUM mmol/L 133* 132* 132* 128*   POTASSIUM mmol/L 4.0 3.5 3.2* 3.8   CHLORIDE mmol/L 104 103 104 96   CO2 mmol/L 25 26 23 24   ANION GAP mmol/L 4 3* 5 8   BUN mg/dL 7 7 10 13   CREATININE mg/dL 0.77 0.77 0.85 0.97   EGFR ml/min/1.73sq m 102 102 98 87   CALCIUM mg/dL 7.9* 7.8* 6.9* 8.2*   MAGNESIUM mg/dL  --   --  2.1  --      Results from last 7 days   Lab Units 08/04/24  0549 08/03/24 0445 08/01/24  2245   AST U/L 144* 121* 119*   ALT U/L 156* 146* 165*   ALK PHOS U/L 55 49 55   TOTAL PROTEIN g/dL 6.0* 5.9* 6.9   ALBUMIN g/dL 2.8* 2.7* 3.3*   TOTAL BILIRUBIN mg/dL 0.63 0.63 0.87   BILIRUBIN DIRECT mg/dL  --  0.14  --          Results from last 7 days   Lab Units 08/04/24  0549 08/03/24  0445 08/02/24  0609 08/01/24  2245   GLUCOSE RANDOM mg/dL 107 100 82 92       Results from last 7 days   Lab Units 08/01/24  2245   PROTIME seconds 15.9*   INR  1.20*   PTT seconds 29         Results from last 7 days   Lab Units 08/02/24  0609 08/01/24  2245   PROCALCITONIN ng/ml 0.86* 0.86*     Results from last 7 days   Lab Units 08/01/24  2245   LACTIC ACID mmol/L 1.0       Results from last 7 days   Lab Units 08/01/24  2245   LIPASE u/L 25                 Results from last 7 days   Lab Units 08/01/24  2258   CLARITY UA  Clear   COLOR UA  Yellow   SPEC GRAV UA  1.023   PH UA  6.0   GLUCOSE UA mg/dl Negative   KETONES UA mg/dl 40 (2+)*   BLOOD UA  Trace*   PROTEIN UA mg/dl 50 (1+)*   NITRITE UA  Negative   BILIRUBIN UA  Negative   UROBILINOGEN UA (BE) mg/dl <2.0   LEUKOCYTES UA  Negative   WBC UA /hpf 4-10*   RBC UA /hpf None Seen   BACTERIA UA /hpf None Seen   EPITHELIAL CELLS WET PREP /hpf None Seen   MUCUS THREADS  Occasional*       Results from last 7 days   Lab Units 08/02/24  0324   C DIFF TOXIN B BY PCR  Negative     Results  from last 7 days   Lab Units 08/02/24  0324   SALMONELLA SP PCR  Negative   SHIGELLA SP/ENTEROINVASIVE E. COLI (EIEC)  Negative   CAMPYLOBACTER SP (JEJUNI AND COLI)  Positive*   SHIGA TOXIN 1/SHIGA TOXIN 2  Negative       ED Treatment-Medication Administration from 08/01/2024 2029 to 08/02/2024 1434         Date/Time Order Dose Route Action     08/01/2024 2246 sodium chloride 0.9 % bolus 1,000 mL 1,000 mL Intravenous New Bag     08/02/2024 0031 vancomycin (VANCOCIN) 2,000 mg in sodium chloride 0.9 % 500 mL IVPB 2,000 mg Intravenous New Bag     08/01/2024 2258 piperacillin-tazobactam (ZOSYN) IVPB 4.5 g 4.5 g Intravenous New Bag     08/01/2024 2354 nystatin (MYCOSTATIN) oral suspension 500,000 Units 500,000 Units Swish & Swallow Given     08/02/2024 0919 nystatin (MYCOSTATIN) oral suspension 500,000 Units 500,000 Units Swish & Swallow Given     08/02/2024 1411 nystatin (MYCOSTATIN) oral suspension 500,000 Units 500,000 Units Swish & Swallow Given              08/02/2024 0609 sodium chloride 0.9 % infusion 100 mL/hr Intravenous New Bag     08/02/2024 0610 piperacillin-tazobactam (ZOSYN) IVPB 4.5 g 4.5 g Intravenous New Bag     08/02/2024 0920 enoxaparin (LOVENOX) subcutaneous injection 40 mg 40 mg Subcutaneous Given     08/02/2024 1411 azithromycin (ZITHROMAX) tablet 500 mg 500 mg Oral Given            History reviewed. No pertinent past medical history.  Present on Admission:  **None**      Admitting Diagnosis: Diarrhea of presumed infectious origin [R19.7]  Diarrhea [R19.7]  Abdominal pain [R10.9]  Transaminitis [R74.01]  Acute sepsis (HCC) [A41.9]  Age/Sex: 53 y.o. male  Admission Orders:  Scheduled Medications:  azithromycin, 500 mg, Oral, Q24H  enoxaparin, 40 mg, Subcutaneous, Daily  nystatin, 500,000 Units, Swish & Swallow, 4x Daily      Continuous IV Infusions:  lactated ringers, 125 mL/hr, Intravenous, Continuous      PRN Meds:  acetaminophen, 650 mg, Oral, Q6H PRN    OOB  SCD  GI DIET      IP CONSULT TO  GASTROENTEROLOGY    Network Utilization Review Department  ATTENTION: Please call with any questions or concerns to [phone] and carefully listen to the prompts so that you are directed to the right person. All voicemails are confidential.   For Discharge needs, contact Care Management DC Support Team at [phone] opt. 2  Send all requests for admission clinical reviews, approved or denied determinations and any other requests to dedicated fax number below belonging to the campus where the patient is receiving treatment. List of dedicated fax numbers for the Facilities:  FACILITY NAME UR FAX NUMBER   ADMISSION DENIALS (Administrative/Medical Necessity) [phone]   DISCHARGE SUPPORT TEAM (NETWORK) [phone]   PARENT CHILD HEALTH (Maternity/NICU/Pediatrics) [phone]   [facility] [phone]   [facility] [phone]   [facility] [phone]   [facility] [phone]   [facility] [phone]   [facility] [phone]   [facility] [phone]   [facility] [phone]   [facility] [phone]   [facility] [phone]   [facility] [phone]   [facility] [phone]

## 2024-08-03 NOTE — PLAN OF CARE
Problem: Potential for Falls  Goal: Patient will remain free of falls  Description: INTERVENTIONS:  - Educate patient/family on patient safety including physical limitations  - Instruct patient to call for assistance with activity   - Consult OT/PT to assist with strengthening/mobility   - Keep Call bell within reach  - Keep bed low and locked with side rails adjusted as appropriate  - Keep care items and personal belongings within reach  - Initiate and maintain comfort rounds  - Make Fall Risk Sign visible to staff  - Offer Toileting every  Hours, in advance of need  - Initiate/Maintain alarm  - Obtain necessary fall risk management equipment:   - Apply yellow socks and bracelet for high fall risk patients  - Consider moving patient to room near nurses station  8/3/2024 0256 by Shahnaz Hodge RN  Outcome: Progressing  8/3/2024 0255 by Shahnaz Hodge RN  Outcome: Progressing  8/3/2024 0255 by Shahnaz Hodge RN  Outcome: Progressing     Problem: PAIN - ADULT  Goal: Verbalizes/displays adequate comfort level or baseline comfort level  Description: Interventions:  - Encourage patient to monitor pain and request assistance  - Assess pain using appropriate pain scale  - Administer analgesics based on type and severity of pain and evaluate response  - Implement non-pharmacological measures as appropriate and evaluate response  - Consider cultural and social influences on pain and pain management  - Notify physician/advanced practitioner if interventions unsuccessful or patient reports new pain  Outcome: Progressing     Problem: INFECTION - ADULT  Goal: Absence or prevention of progression during hospitalization  Description: INTERVENTIONS:  - Assess and monitor for signs and symptoms of infection  - Monitor lab/diagnostic results  - Monitor all insertion sites, i.e. indwelling lines, tubes, and drains  - Monitor endotracheal if appropriate and nasal secretions for changes in amount and  color  - [place] appropriate cooling/warming therapies per order  - Administer medications as ordered  - Instruct and encourage patient and family to use good hand hygiene technique  - Identify and instruct in appropriate isolation precautions for identified infection/condition  Outcome: Progressing  Goal: Absence of fever/infection during neutropenic period  Description: INTERVENTIONS:  - Monitor WBC    Outcome: Progressing     Problem: SAFETY ADULT  Goal: Patient will remain free of falls  Description: INTERVENTIONS:  - Educate patient/family on patient safety including physical limitations  - Instruct patient to call for assistance with activity   - Consult OT/PT to assist with strengthening/mobility   - Keep Call bell within reach  - Keep bed low and locked with side rails adjusted as appropriate  - Keep care items and personal belongings within reach  - Initiate and maintain comfort rounds  - Make Fall Risk Sign visible to staff  - Offer Toileting every  Hours, in advance of need  - Initiate/Maintain alarm  - Obtain necessary fall risk management equipment:   - Apply yellow socks and bracelet for high fall risk patients  - Consider moving patient to room near nurses station  8/3/2024 0256 by Shahnaz Hodge RN  Outcome: Progressing  8/3/2024 0255 by Shahnaz Hodge RN  Outcome: Progressing  8/3/2024 0255 by Shahnaz Hodge RN  Outcome: Progressing  Goal: Maintain or return to baseline ADL function  Description: INTERVENTIONS:  -  Assess patient's ability to carry out ADLs; assess patient's baseline for ADL function and identify physical deficits which impact ability to perform ADLs (bathing, care of mouth/teeth, toileting, grooming, dressing, etc.)  - Assess/evaluate cause of self-care deficits   - Assess range of motion  - Assess patient's mobility; develop plan if impaired  - Assess patient's need for assistive devices and provide as appropriate  - Encourage maximum independence but  intervene and supervise when necessary  - Involve family in performance of ADLs  - Assess for home care needs following discharge   - Consider OT consult to assist with ADL evaluation and planning for discharge  - Provide patient education as appropriate  Outcome: Progressing  Goal: Maintains/Returns to pre admission functional level  Description: INTERVENTIONS:  - Perform AM-PAC 6 Click Basic Mobility/ Daily Activity assessment daily.  - Set and communicate daily mobility goal to care team and patient/family/caregiver.   - Collaborate with rehabilitation services on mobility goals if consulted  - Perform Range of Motion  times a day.  - Reposition patient every  hours.  - Dangle patient  times a day  - Stand patient  times a day  - Ambulate patient  times a day  - Out of bed to chair  times a day   - Out of bed for meals  times a day  - Out of bed for toileting  - Record patient progress and toleration of activity level   Outcome: Progressing     Problem: DISCHARGE PLANNING  Goal: Discharge to home or other facility with appropriate resources  Description: INTERVENTIONS:  - Identify barriers to discharge w/patient and caregiver  - Arrange for needed discharge resources and transportation as appropriate  - Identify discharge learning needs (meds, wound care, etc.)  - Arrange for interpretive services to assist at discharge as needed  - Refer to Case Management Department for coordinating discharge planning if the patient needs post-hospital services based on physician/advanced practitioner order or complex needs related to functional status, cognitive ability, or social support system  Outcome: Progressing     Problem: Knowledge Deficit  Goal: Patient/family/caregiver demonstrates understanding of disease process, treatment plan, medications, and discharge instructions  Description: Complete learning assessment and assess knowledge base.  Interventions:  - Provide teaching at level of understanding  - Provide  teaching via preferred learning methods  Outcome: Progressing

## 2024-08-03 NOTE — PROGRESS NOTES
[facility]  Progress Note  Name: Tevin Rebolledo I MRN: [id number]  Unit/Bed#: S -01I Date of Admission: 8/1/2024   Date of Service: 8/1/2024  I Hospital Day: 0    * Sepsis (HCC)  Assessment & Plan  SIRS criteria met with leukocytosis and tachycardia  Source: Infectious diarrhea  Labs:  WBC: 12.5  Lactic 1.0  Pro-Spencer 0.86, repeat the following day the same  Imaging  CT A/P.  Significant for enteritis and splenomegaly  Noted on IV vancomycin and Zosyn in the ED, switched to azithromycin by GI  C. difficile negative   Stool enteric panel positive for Campylobacter  Will stop IV fluids and monitor    Diarrhea of presumed infectious origin  Assessment & Plan  Presenting with 3 weeks of persistent diarrhea after he ate some undercooked chicken on 7/14.  Associated with intermittent lower abdominal pain, fever and night sweats.  Denies recent travel, sick contacts, history of IBD/IBS, recent antibiotic use  Seen by his PCP who ordered stool studies which were negative for any infectious diarrheal causes  CT A/P significant for liquid stool in the colon consistent with enteritis.  Negative for bowel obstruction  Fam Hx significant for colon cancer, biological father dx at age 50.   Pt had colonoscopy done 15 years ago which was grossly normal, is overdue for repeat   Fecal calprotectin pending  Ova and parasite pending  GI consulted, recommendations pending  8/2 transitioned to azithromycin  Low residue diet  Elevated LFTs most likely secondary to Campylobacter infection  Outpatient colonoscopy in 6 to 8 weeks  C. difficile negative  Stool enteric panel positive for Campylobacter  Continue azithromycin    Hyponatremia  Assessment & Plan  Sodium 128 on admission, likely secondary to GI losses.  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  He has been having a low-salt diet since he heard that was healthy  for him.  Suspect element of dilutional hyponatremia.  Patient is going to put extra salt on his meals.  Continue to monitor    Lab Results   Component Value Date/Time    SODIUM 132 (L) 08/03/2024 04:45 AM    SODIUM 132 (L) 08/02/2024 06:09 AM    SODIUM 128 (L) 08/01/2024 10:45 PM        Oral thrush  Assessment & Plan  Mild white film noted on the tongue, pt c/o dry mouth sensation.   Continue nystatin swish and swallow     Splenomegaly  Assessment & Plan  CT A/P shows nonspecific splenomegaly, estimated volume 800 mL  Possibly due to 3 weeks abdominal infection  Appreciate GI input  Recommend outpatient follow-up          VTE Pharmacologic Prophylaxis: VTE Score: 3 Moderate Risk (Score 3-4) - Pharmacological DVT Prophylaxis Ordered: enoxaparin (Lovenox).    Mobility:   JH-HLM Achieved: 7: Walk 25 feet or more  JH-HLM Goal achieved. Continue to encourage appropriate mobility.    Patient Centered Rounds: I evaluated the patient without nursing staff present due to RN unavailable, message via epic chat    Discussions with Specialists or Other Care Team Provider: N/A    Education and Discussions with Family / Patient: Patient declined call to .     Total Time Spent on Date of Encounter in care of patient: 40 mins. This time was spent on one or more of the following: performing physical exam; counseling and coordination of care; obtaining or reviewing history; documenting in the medical record; reviewing/ordering tests, medications or procedures; communicating with other healthcare professionals and discussing with patient's family/caregivers.    Current Length of Stay: 0 day(s)  Current Patient Status: Observation   Certification Statement: The patient will continue to require additional inpatient hospital stay due to continued diarrhea, hyponatremia.  Discharge Plan: Anticipate discharge tomorrow to home.    Code Status: Level 1 - Full Code    Subjective:   Patient is feeling much better.  He is  continuing to have diarrhea and states he had 4 bowel movements overnight.  He states this was more than he has been having but he had a lot to eat for dinner last night.  He is drinking lots of fluids to stay hydrated.  Discussed his low sodium levels and he plans to put extra salt on his food.  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  He said he has been drinking 1 to 2 gallons of water a day while working.  He denies pain at this time.  All questions and concerns addressed.    Objective:     Vitals:   Temp (24hrs), Av.5 °F (36.9 °C), Min:97.6 °F (36.4 °C), Max:99.7 °F (37.6 °C)    Temp:  [97.6 °F (36.4 °C)-99.7 °F (37.6 °C)] 97.6 °F (36.4 °C)  HR:  [84-88] 88  Resp:  [15-16] 16  BP: (118-127)/(68-75) 122/68  SpO2:  [92 %-94 %] 94 %  Body mass index is 33.23 kg/m².     Input and Output Summary (last 24 hours):     Intake/Output Summary (Last 24 hours) at 8/3/2024 1608  Last data filed at 2024 1900  Gross per 24 hour   Intake 240 ml   Output --   Net 240 ml       Physical Exam:   Physical Exam  Vitals and nursing note reviewed.   Constitutional:       General: He is not in acute distress.     Appearance: Normal appearance. He is normal weight. He is not ill-appearing.   HENT:      Head: Normocephalic.      Nose: Nose normal.      Mouth/Throat:      Mouth: Mucous membranes are moist.      Pharynx: Oropharynx is clear.   Eyes:      Conjunctiva/sclera: Conjunctivae normal.   Cardiovascular:      Rate and Rhythm: Normal rate and regular rhythm.      Pulses: Normal pulses.      Heart sounds: Normal heart sounds. No murmur heard.  Pulmonary:      Effort: Pulmonary effort is normal. No respiratory distress.      Breath sounds: Normal breath sounds. No wheezing or rhonchi.   Abdominal:      General: Bowel sounds are normal. There is no distension.      Palpations: Abdomen is soft.      Tenderness: There is no abdominal tenderness. There is no guarding.    Musculoskeletal:         General: Normal range of motion.      Cervical back: Normal range of motion.      Right lower leg: No edema.      Left lower leg: No edema.   Skin:     General: Skin is warm and dry.   Neurological:      General: No focal deficit present.      Mental Status: He is alert and oriented to person, place, and time. Mental status is at baseline.   Psychiatric:         Mood and Affect: Mood normal.         Behavior: Behavior normal.         Thought Content: Thought content normal.          Additional Data:     Labs:  Results from last 7 days   Lab Units 08/02/24  0609   WBC Thousand/uL 11.40*   HEMOGLOBIN g/dL 12.0   HEMATOCRIT % 35.4*   PLATELETS Thousands/uL 203   BANDS PCT % 5   LYMPHO PCT % 44   MONO PCT % 5   EOS PCT % 0     Results from last 7 days   Lab Units 08/03/24  0445   SODIUM mmol/L 132*   POTASSIUM mmol/L 3.5   CHLORIDE mmol/L 103   CO2 mmol/L 26   BUN mg/dL 7   CREATININE mg/dL 0.77   ANION GAP mmol/L 3*   CALCIUM mg/dL 7.8*   ALBUMIN g/dL 2.7*   TOTAL BILIRUBIN mg/dL 0.63   ALK PHOS U/L 49   ALT U/L 146*   AST U/L 121*   GLUCOSE RANDOM mg/dL 100     Results from last 7 days   Lab Units 08/01/24  2245   INR  1.20*             Results from last 7 days   Lab Units 08/02/24  0609 08/01/24  2245   LACTIC ACID mmol/L  --  1.0   PROCALCITONIN ng/ml 0.86* 0.86*       Lines/Drains:  Invasive Devices       Peripheral Intravenous Line  Duration             Peripheral IV 08/01/24 Right Antecubital 1 day                          Imaging: Reviewed radiology reports from this admission including: abdominal/pelvic CT    Recent Cultures (last 7 days):   Results from last 7 days   Lab Units 08/02/24  0324   C DIFF TOXIN B BY PCR  Negative       Last 24 Hours Medication List:   Current Facility-Administered Medications   Medication Dose Route Frequency Provider Last Rate    acetaminophen  650 mg Oral Q6H PRN Bethany Arboleda PA-C      azithromycin  500 mg Oral Q24H Holly Sneed MD       enoxaparin  40 mg Subcutaneous Daily Bethany Arboleda PA-C      nystatin  500,000 Units Swish & Swallow 4x Daily Bethany Arboleda PA-C          Today, Patient Was Seen By: ABDIEL Soto    **Please Note: This note may have been constructed using a voice recognition system.**

## 2024-08-04 VITALS
HEART RATE: 98 BPM | SYSTOLIC BLOOD PRESSURE: 129 MMHG | TEMPERATURE: 97.4 F | WEIGHT: 225 LBS | OXYGEN SATURATION: 94 % | BODY MASS INDEX: 33.23 KG/M2 | DIASTOLIC BLOOD PRESSURE: 81 MMHG | RESPIRATION RATE: 20 BRPM

## 2024-08-04 PROBLEM — R74.01 TRANSAMINITIS: Status: ACTIVE | Noted: 2024-08-04

## 2024-08-04 PROBLEM — A04.5 CAMPYLOBACTER ENTERITIS: Status: ACTIVE | Noted: 2024-08-02

## 2024-08-04 PROBLEM — J34.89 NASAL LESION: Status: ACTIVE | Noted: 2024-08-04

## 2024-08-04 LAB
ALBUMIN SERPL BCG-MCNC: 2.8 G/DL (ref 3.5–5)
ALP SERPL-CCNC: 55 U/L (ref 34–104)
ALT SERPL W P-5'-P-CCNC: 156 U/L (ref 7–52)
ANION GAP SERPL CALCULATED.3IONS-SCNC: 4 MMOL/L (ref 4–13)
AST SERPL W P-5'-P-CCNC: 144 U/L (ref 13–39)
ATRIAL RATE: 92 BPM
BILIRUB SERPL-MCNC: 0.63 MG/DL (ref 0.2–1)
BUN SERPL-MCNC: 7 MG/DL (ref 5–25)
CALCIUM ALBUM COR SERPL-MCNC: 8.9 MG/DL (ref 8.3–10.1)
CALCIUM SERPL-MCNC: 7.9 MG/DL (ref 8.4–10.2)
CHLORIDE SERPL-SCNC: 104 MMOL/L (ref 96–108)
CO2 SERPL-SCNC: 25 MMOL/L (ref 21–32)
CREAT SERPL-MCNC: 0.77 MG/DL (ref 0.6–1.3)
ERYTHROCYTE [DISTWIDTH] IN BLOOD BY AUTOMATED COUNT: 13.8 % (ref 11.6–15.1)
GFR SERPL CREATININE-BSD FRML MDRD: 102 ML/MIN/1.73SQ M
GLUCOSE P FAST SERPL-MCNC: 107 MG/DL (ref 65–99)
GLUCOSE SERPL-MCNC: 107 MG/DL (ref 65–140)
HCT VFR BLD AUTO: 40.5 % (ref 36.5–49.3)
HGB BLD-MCNC: 13.5 G/DL (ref 12–17)
MCH RBC QN AUTO: 29.2 PG (ref 26.8–34.3)
MCHC RBC AUTO-ENTMCNC: 33.3 G/DL (ref 31.4–37.4)
MCV RBC AUTO: 88 FL (ref 82–98)
P AXIS: 27 DEGREES
PLATELET # BLD AUTO: 231 THOUSANDS/UL (ref 149–390)
PMV BLD AUTO: 10.1 FL (ref 8.9–12.7)
POTASSIUM SERPL-SCNC: 4 MMOL/L (ref 3.5–5.3)
PR INTERVAL: 130 MS
PROT SERPL-MCNC: 6 G/DL (ref 6.4–8.4)
QRS AXIS: 81 DEGREES
QRSD INTERVAL: 90 MS
QT INTERVAL: 364 MS
QTC INTERVAL: 450 MS
RBC # BLD AUTO: 4.62 MILLION/UL (ref 3.88–5.62)
SODIUM SERPL-SCNC: 133 MMOL/L (ref 135–147)
T WAVE AXIS: 31 DEGREES
VENTRICULAR RATE: 92 BPM
WBC # BLD AUTO: 18.22 THOUSAND/UL (ref 4.31–10.16)

## 2024-08-04 PROCEDURE — 99239 HOSP IP/OBS DSCHRG MGMT >30: CPT | Performed by: PHYSICIAN ASSISTANT

## 2024-08-04 PROCEDURE — 85027 COMPLETE CBC AUTOMATED: CPT

## 2024-08-04 PROCEDURE — 93010 ELECTROCARDIOGRAM REPORT: CPT | Performed by: INTERNAL MEDICINE

## 2024-08-04 PROCEDURE — 80053 COMPREHEN METABOLIC PANEL: CPT

## 2024-08-04 RX ORDER — VALACYCLOVIR HYDROCHLORIDE 500 MG/1
500 TABLET, FILM COATED ORAL 2 TIMES DAILY PRN
Start: 2024-08-04 | End: 2024-08-14

## 2024-08-04 RX ADMIN — NYSTATIN 500000 UNITS: 100000 SUSPENSION ORAL at 08:00

## 2024-08-04 RX ADMIN — AZITHROMYCIN DIHYDRATE 500 MG: 250 TABLET ORAL at 10:59

## 2024-08-04 NOTE — DISCHARGE INSTR - AVS FIRST PAGE
Dear Tevin Rebolledo,     It was our pleasure to care for you here at [facility].  It is our hope that we were always able to exceed the expected standards for your care during your stay.  You were hospitalized due to campylobacter enteritis.  You were cared for on the 3rd floor by Rachel Arias PA-C under the service of Juan Allen MD with the [facility] who covers for your primary care physician (PCP), Ann Stewart DO, while you were hospitalized.  If you have any questions or concerns related to this hospitalization, you may contact us at .  For follow up as well as any medication refills, we recommend that you follow up with your primary care physician.  A registered nurse will reach out to you by phone within a few days after your discharge to answer any additional questions that you may have after going home.  However, at this time we provide for you here, the most important instructions / recommendations at discharge:     Notable Medication Adjustments -   none  Testing Required after Discharge -   CBC with differential and liver function testing in next 2-3 days  Ultrasound of right upper quadrant   ** Please contact your PCP to request testing orders for any of the testing recommended here **  Important follow up information -   Follow up with GI for arranging a colonoscopy in 6-8 weeks  Other Instructions -   Drink plenty of fluids  If develop fever, chills or worsening symptoms - return to the ED  Please review this entire after visit summary as additional general instructions including medication list, appointments, activity, diet, any pertinent wound care, and other additional recommendations from your care team that may be provided for you.      Sincerely,     Rachel Arias PA-C

## 2024-08-04 NOTE — DISCHARGE SUMMARY
[facility]  Discharge- Tevin Rebolledo 1970, 53 y.o. male MRN: [id number]  Unit/Bed#: S -[facility] Encounter: [id number]  Primary Care Provider: Ann Stewart DO   Date and time admitted to hospital: 8/1/2024  8:31 PM    * Campylobacter enteritis  Assessment & Plan  Presented with 3 weeks of persistent diarrhea, fever, night sweats after he ate undercooked chicken on 7/14  Seen by his PCP 7/25 and recommended had stool studies performed on 726 and was negative for Salmonella, Shigella and Campylobacter at that time.  Presented given continued symptoms.  CT A/P significant for liquid stool in the colon consistent with enteritis.   Fecal calprotectin and ova and parasite pending  GI consulted - recommended azithromycin.  Will complete third dose today prior to discharge  Outpatient colonoscopy in 6 to 8 weeks (last 15 years ago, overdue)  WBC count nathaly however patient is having formed bowel movements, no further GI symptoms. No fevers.  Outpt CBC     Sepsis (HCC)  Assessment & Plan  SIRS criteria met prior with leukocytosis and tachycardia  Source: campylobacter enteritis  CT A/P.  Significant for enteritis   Status post Vanco and Zosyn in the emergency department, Campylobacter returned positive and transition to azithromycin.  Status post 3 doses total.  WBC did rise to 18 on 8/4/2024 however patient having formed bowel movements, no GI symptoms at this time  No fever or chills  Does have nose lesions over the last 1-2 days which he has had intermittently for the last 2 years and for which he takes valtrex and use PRN ointments (as recommended by derm). This could be contributing to his WBC  Discussed need for outpatient laboratory studies to ensure stability/improvement in WBC    Nasal lesion  Assessment & Plan  Bilateral nare fluid filled lesions, left now scabbed over, right with 2 lesions. Mild erythema surrounding.   Previously saw dermatology at [facility] and was told that  it was a bacterial infection and prescribed an antibiotic ointment as well as previously informed that it was related to herpes and prescribed Valtrex.  Will resume valtrex and follow up with derm/PCP    Splenomegaly  Assessment & Plan  CT A/P shows splenomegaly  May be related to infection however recommend outpatient monitoring    Transaminitis  Assessment & Plan  Lab Results   Component Value Date     (H) 08/04/2024     (H) 08/03/2024     (H) 08/01/2024     (H) 08/04/2024     (H) 08/03/2024     (H) 08/01/2024      Lab Results   Component Value Date    TBILI 0.63 08/04/2024    TBILI 0.63 08/03/2024    TBILI 0.87 08/01/2024       Bili within normal limits however AST and ALT elevated  Discussed with GI.  Likely related to Campylobacter infection however recommending outpatient to repeat hepatic function panel, PT/INR, acute hepatitis panel and right upper quadrant ultrasound    Hyponatremia  Assessment & Plan  Sodium 128 on admission, likely secondary to GI losses.   Improved with IV fluid hydration    Lab Results   Component Value Date/Time    SODIUM 132 (L) 08/03/2024 04:45 AM    SODIUM 132 (L) 08/02/2024 06:09 AM    SODIUM 128 (L) 08/01/2024 10:45 PM          Medical Problems       Resolved Problems  Date Reviewed: 8/4/2024   None       Discharging Physician / Practitioner: Rachel Arias PA-C  PCP: Ann Stewart DO  Admission Date:   Admission Orders (From admission, onward)       Ordered        08/02/24 0253  Place in Observation  Once                          Discharge Date: 08/04/24    Consultations During Hospital Stay:  GI    Procedures Performed:   none    Significant Findings / Test Results:   CT AP: Liquid stool in the colon, consider an enteritis in the appropriate clinical setting. No evidence of bowel obstruction. Splenomegaly. Small fat-containing left inguinal hernia.Subcentimeter hypoattenuating renal lesion(s), too small to characterize but statistically  likely benign, which do not warrant follow-up    CXR: No acute cardiopulmonary disease.     Incidental Findings:   Splenomegaly  Renal lesions -  no follow up needed   I reviewed the above mentioned incidental findings with the patient and/or family and they expressed understanding.    Test Results Pending at Discharge (will require follow up):   O&P  Fecal calprotectin     Outpatient Tests Requested:  INR, acute hepatitis panel, hepatic function panel, CBC with diff this week  RUQ US    Complications:  none    Reason for Admission: diarrhea    Hospital Course:   Tevin Rebolledo is a 53 y.o. male patient who originally presented to the hospital on 8/1/2024 due to diarrhea.  The patient reportedly ate undercooked chicken on July 14 since that time was having night sweats, chills, continued diarrhea.  Patient saw his PCP on July 25 and had stool studies performed which were negative.  These included Salmonella, Shigella and Campylobacter.  The patient had continued diarrhea and therefore came to the emergency department for further evaluation and was noted to have CAT scan performed which showed enteritis.  Stool studies showed positive for Campylobacter.  Patient was seen in consultation by GI and started on azithromycin.  Previously had received Vanco and Zosyn in the emergency department.  The patient did meet septic criteria secondary to a white count of 12 and tachycardia previously.  His white count initially improved however on day of discharge was 18.  The patient however had had formed bowel movements and resolution of GI symptoms, was feeling well and eating and drinking without difficulty.  He did have development of nasal fluid-filled lesions which she has had for approximately 2 years and been seen by dermatology for in the past.  This may be potentially related to his elevation in WBC count.  He previously has taken Valtrex for this which will he will resume as an outpatient (has been taking previously  and thus do not feel this is etiology for his transaminitis).    The patient reported that he had family issues at home and was requesting to leave as soon as possible.  I have advised him that his white blood cell count has increased and therefore he will have outpatient laboratory studies performed.  He is advised to call his PCP.  He is recommended to have a CBC with differential, PT/INR, acute hepatitis panel, hepatic function panel and right upper quadrant ultrasound.     Please see above list of diagnoses and related plan for additional information.     Condition at Discharge: stable    Discharge Day Visit / Exam:   Subjective: Had family issues with his son and his son's mother.  Is requesting to be discharged to soon as possible to be able to handle those issues.  He had a formed bowel movement this morning.  Yesterday he had 2 semiformed bowel movements.  He has had no further abdominal discomfort.  No night sweats or chills.  Eating and drinking without difficulty.  Does have 2 lesions on his right nare and 1 on his left that was noted to have scabbed over.  He reports he gets surrounding redness from this.  He has been seen by dermatology for this previously and had taken Valtrex as well as an antibiotic ointment for them in the past.  Vitals: Blood Pressure: 129/81 (08/04/24 0759)  Pulse: 98 (08/04/24 0923)  Temperature: (!) 97.4 °F (36.3 °C) (08/04/24 0759)  Temp Source: Oral (08/04/24 0759)  Respirations: 20 (08/04/24 0759)  Weight - Scale: 102 kg (225 lb) (08/01/24 2314)  SpO2: 94 % (08/04/24 0759)  Exam:   Physical Exam  Vitals and nursing note reviewed.   Constitutional:       General: He is not in acute distress.     Appearance: Normal appearance. He is not ill-appearing or diaphoretic.   HENT:      Head: Normocephalic and atraumatic.      Nose:      Comments: Left nare small amt of erythema, small scab  Right nare with 2 fluid filled lesions, mild surrounding erythema     Mouth/Throat:      Mouth:  Mucous membranes are moist.   Cardiovascular:      Rate and Rhythm: Normal rate and regular rhythm.   Pulmonary:      Effort: Pulmonary effort is normal.      Breath sounds: Normal breath sounds. No stridor. No wheezing, rhonchi or rales.   Abdominal:      General: Bowel sounds are normal. There is no distension.      Palpations: Abdomen is soft.      Tenderness: There is no abdominal tenderness. There is no guarding.      Comments: NTTP. Normoactive BS   Musculoskeletal:      Right lower leg: No edema.      Left lower leg: No edema.   Skin:     General: Skin is warm and dry.   Neurological:      Mental Status: He is alert.   Psychiatric:         Mood and Affect: Mood normal.         Behavior: Behavior normal.          Discussion with Family: Patient declined call to .     Discharge instructions/Information to patient and family:   See after visit summary for information provided to patient and family.      Provisions for Follow-Up Care:  See after visit summary for information related to follow-up care and any pertinent home health orders.      Mobility at time of Discharge:   Basic Mobility Inpatient Raw Score: 24  JH-HLM Goal: 8: Walk 250 feet or more  JH-HLM Achieved: 8: Walk 250 feet ot more  HLM Goal achieved. Continue to encourage appropriate mobility.     Disposition:   Home    Planned Readmission: no     Discharge Statement:  I spent 48 minutes discharging the patient. This time was spent on the day of discharge. I had direct contact with the patient on the day of discharge. Greater than 50% of the total time was spent examining patient, answering all patient questions, arranging and discussing plan of care with patient as well as directly providing post-discharge instructions.  Additional time then spent on discharge activities.    Discharge Medications:  See after visit summary for reconciled discharge medications provided to patient and/or family.      **Please Note: This note may have been  constructed using a voice recognition system**

## 2024-08-04 NOTE — INCIDENTAL FINDINGS
The following findings require follow up:  Radiographic finding   Finding: enlarged spleen   Follow up required: surveillance    Follow up should be done within 1 month(s)    Please notify the following clinician to assist with the follow up:   Dr. Stewart    The following findings require follow up:  Radiographic finding   Finding: small renal lesions    Follow up required:  none    Please notify the following clinician to assist with the follow up:   Dr. Stewart    Incidental finding results were discussed with the Patient by Rachel Arias PA-C on 08/04/24.   They expressed understanding and all questions answered.

## 2024-08-04 NOTE — ASSESSMENT & PLAN NOTE
Bilateral nare fluid filled lesions, left now scabbed over, right with 2 lesions. Mild erythema surrounding.   Previously saw dermatology at [facility] and was told that it was a bacterial infection and prescribed an antibiotic ointment as well as previously informed that it was related to herpes and prescribed Valtrex.  Will resume valtrex and follow up with derm/PCP

## 2024-08-04 NOTE — ASSESSMENT & PLAN NOTE
Lab Results   Component Value Date     (H) 08/04/2024     (H) 08/03/2024     (H) 08/01/2024     (H) 08/04/2024     (H) 08/03/2024     (H) 08/01/2024      Lab Results   Component Value Date    TBILI 0.63 08/04/2024    TBILI 0.63 08/03/2024    TBILI 0.87 08/01/2024       Bili within normal limits however AST and ALT elevated  Discussed with GI.  Likely related to Campylobacter infection however recommending outpatient to repeat hepatic function panel, PT/INR, acute hepatitis panel and right upper quadrant ultrasound

## 2024-08-04 NOTE — PLAN OF CARE
Problem: Potential for Falls  Goal: Patient will remain free of falls  Description: INTERVENTIONS:  - Educate patient/family on patient safety including physical limitations  - Instruct patient to call for assistance with activity   - Consult OT/PT to assist with strengthening/mobility   - Keep Call bell within reach  - Keep bed low and locked with side rails adjusted as appropriate  - Keep care items and personal belongings within reach  - Initiate and maintain comfort rounds  - Make Fall Risk Sign visible to staff  - Apply yellow socks and bracelet for high fall risk patients  - Consider moving patient to room near nurses station  8/4/2024 1010 by Sridevi Avila RN  Outcome: Adequate for Discharge  8/4/2024 0806 by Sridevi Avila RN  Outcome: Progressing     Problem: PAIN - ADULT  Goal: Verbalizes/displays adequate comfort level or baseline comfort level  Description: Interventions:  - Encourage patient to monitor pain and request assistance  - Assess pain using appropriate pain scale  - Administer analgesics based on type and severity of pain and evaluate response  - Implement non-pharmacological measures as appropriate and evaluate response  - Consider cultural and social influences on pain and pain management  - Notify physician/advanced practitioner if interventions unsuccessful or patient reports new pain  8/4/2024 1010 by Sridevi Avila RN  Outcome: Adequate for Discharge  8/4/2024 0806 by Sridevi Avila RN  Outcome: Progressing     Problem: INFECTION - ADULT  Goal: Absence or prevention of progression during hospitalization  Description: INTERVENTIONS:  - Assess and monitor for signs and symptoms of infection  - Monitor lab/diagnostic results  - Monitor all insertion sites, i.e. indwelling lines, tubes, and drains  - Monitor endotracheal if appropriate and nasal secretions for changes in amount and color  - [place] appropriate cooling/warming therapies per order  - Administer  medications as ordered  - Instruct and encourage patient and family to use good hand hygiene technique  - Identify and instruct in appropriate isolation precautions for identified infection/condition  8/4/2024 1010 by Sridevi Avila RN  Outcome: Adequate for Discharge  8/4/2024 0806 by Sridevi Avila RN  Outcome: Progressing  Goal: Absence of fever/infection during neutropenic period  Description: INTERVENTIONS:  - Monitor WBC    8/4/2024 1010 by Sridevi Avila RN  Outcome: Adequate for Discharge  8/4/2024 0806 by Sridevi Avila RN  Outcome: Progressing     Problem: SAFETY ADULT  Goal: Patient will remain free of falls  Description: INTERVENTIONS:  - Educate patient/family on patient safety including physical limitations  - Instruct patient to call for assistance with activity   - Consult OT/PT to assist with strengthening/mobility   - Keep Call bell within reach  - Keep bed low and locked with side rails adjusted as appropriate  - Keep care items and personal belongings within reach  - Initiate and maintain comfort rounds  - Make Fall Risk Sign visible to staff  - Apply yellow socks and bracelet for high fall risk patients  - Consider moving patient to room near nurses station  8/4/2024 1010 by Sridevi Avila RN  Outcome: Adequate for Discharge  8/4/2024 0806 by Sridevi Avila RN  Outcome: Progressing  Goal: Maintain or return to baseline ADL function  Description: INTERVENTIONS:  -  Assess patient's ability to carry out ADLs; assess patient's baseline for ADL function and identify physical deficits which impact ability to perform ADLs (bathing, care of mouth/teeth, toileting, grooming, dressing, etc.)  - Assess/evaluate cause of self-care deficits   - Assess range of motion  - Assess patient's mobility; develop plan if impaired  - Assess patient's need for assistive devices and provide as appropriate  - Encourage maximum independence but intervene and supervise when necessary  -  Involve family in performance of ADLs  - Assess for home care needs following discharge   - Consider OT consult to assist with ADL evaluation and planning for discharge  - Provide patient education as appropriate  8/4/2024 1010 by Sridevi Avila RN  Outcome: Adequate for Discharge  8/4/2024 0806 by Sridevi Avila RN  Outcome: Progressing  Goal: Maintains/Returns to pre admission functional level  Description: INTERVENTIONS:  - Perform AM-PAC 6 Click Basic Mobility/ Daily Activity assessment daily.  - Set and communicate daily mobility goal to care team and patient/family/caregiver.   - Collaborate with rehabilitation services on mobility goals if consulted  - Out of bed for toileting  - Record patient progress and toleration of activity level   8/4/2024 1010 by Sridevi Avila RN  Outcome: Adequate for Discharge  8/4/2024 0806 by Sridevi Avila RN  Outcome: Progressing     Problem: DISCHARGE PLANNING  Goal: Discharge to home or other facility with appropriate resources  Description: INTERVENTIONS:  - Identify barriers to discharge w/patient and caregiver  - Arrange for needed discharge resources and transportation as appropriate  - Identify discharge learning needs (meds, wound care, etc.)  - Arrange for interpretive services to assist at discharge as needed  - Refer to Case Management Department for coordinating discharge planning if the patient needs post-hospital services based on physician/advanced practitioner order or complex needs related to functional status, cognitive ability, or social support system  8/4/2024 1010 by Sridevi Avila RN  Outcome: Adequate for Discharge  8/4/2024 0806 by Sridevi Avila RN  Outcome: Progressing     Problem: Knowledge Deficit  Goal: Patient/family/caregiver demonstrates understanding of disease process, treatment plan, medications, and discharge instructions  Description: Complete learning assessment and assess knowledge base.  Interventions:  -  Provide teaching at level of understanding  - Provide teaching via preferred learning methods  8/4/2024 1010 by Sridevi Avila RN  Outcome: Adequate for Discharge  8/4/2024 0806 by Sridevi Avila RN  Outcome: Progressing

## 2024-08-04 NOTE — PLAN OF CARE

## 2024-08-05 ENCOUNTER — TELEPHONE (OUTPATIENT)
Dept: GASTROENTEROLOGY | Facility: CLINIC | Age: 54
End: 2024-08-05

## 2024-08-05 LAB — CALPROTECTIN STL-MCNC: 25.2 ÂΜG/G

## 2024-08-05 NOTE — TELEPHONE ENCOUNTER
----- Message from Sandy RODRIGUEZ sent at 8/5/2024  7:36 AM EDT -----    ----- Message -----  From: Radha Newton PA-C  Sent: 8/3/2024   3:08 PM EDT  To: [facility]    Patient currently admitted at [facility], please arrange for outpatient colonoscopy in 6 to 8 weeks, thank you.

## 2024-08-07 LAB
BACTERIA BLD CULT: NORMAL
BACTERIA BLD CULT: NORMAL
G LAMBLIA AG STL QL IA: NEGATIVE
O+P STL CONC: NORMAL

## 2024-08-12 ENCOUNTER — TELEPHONE (OUTPATIENT)
Dept: GASTROENTEROLOGY | Facility: CLINIC | Age: 54
End: 2024-08-12

## 2024-08-12 NOTE — TELEPHONE ENCOUNTER
Pt returning results call and advised of below. Pt verbalized understanding; no further concerns.

## 2024-08-12 NOTE — TELEPHONE ENCOUNTER
----- Message from ABDIEL Soto sent at 8/12/2024  8:53 AM EDT -----  Please call and notify patient that stool for Giardia, ova and parasite was negative for infection.  Patient was treated while in hospital for Campylobacter infection in stool. Thank you

## 2024-09-03 PROBLEM — A41.9 SEPSIS (HCC): Status: RESOLVED | Noted: 2024-08-02 | Resolved: 2024-09-03

## 2024-09-06 ENCOUNTER — ANESTHESIA (OUTPATIENT)
Dept: ANESTHESIOLOGY | Facility: HOSPITAL | Age: 54
End: 2024-09-06

## 2024-09-06 ENCOUNTER — ANESTHESIA EVENT (OUTPATIENT)
Dept: ANESTHESIOLOGY | Facility: HOSPITAL | Age: 54
End: 2024-09-06

## 2024-09-18 ENCOUNTER — ANESTHESIA EVENT (OUTPATIENT)
Dept: GASTROENTEROLOGY | Facility: AMBULARY SURGERY CENTER | Age: 54
End: 2024-09-18
Payer: COMMERCIAL

## 2024-09-18 ENCOUNTER — HOSPITAL ENCOUNTER (OUTPATIENT)
Dept: GASTROENTEROLOGY | Facility: AMBULARY SURGERY CENTER | Age: 54
Setting detail: OUTPATIENT SURGERY
Discharge: HOME | End: 2024-09-18
Payer: COMMERCIAL

## 2024-09-18 VITALS
SYSTOLIC BLOOD PRESSURE: 107 MMHG | WEIGHT: 220 LBS | OXYGEN SATURATION: 97 % | DIASTOLIC BLOOD PRESSURE: 69 MMHG | BODY MASS INDEX: 32.58 KG/M2 | HEIGHT: 69 IN | TEMPERATURE: 97.8 F | HEART RATE: 79 BPM | RESPIRATION RATE: 16 BRPM

## 2024-09-18 DIAGNOSIS — A09 DIARRHEA OF INFECTIOUS ORIGIN: ICD-10-CM

## 2024-09-18 PROCEDURE — 45378 DIAGNOSTIC COLONOSCOPY: CPT | Performed by: INTERNAL MEDICINE

## 2024-09-18 RX ORDER — PROPOFOL 10 MG/ML
INJECTION, EMULSION INTRAVENOUS CONTINUOUS PRN
Status: DISCONTINUED | OUTPATIENT
Start: 2024-09-18 | End: 2024-09-18

## 2024-09-18 RX ORDER — PROPOFOL 10 MG/ML
INJECTION, EMULSION INTRAVENOUS AS NEEDED
Status: DISCONTINUED | OUTPATIENT
Start: 2024-09-18 | End: 2024-09-18

## 2024-09-18 RX ORDER — SODIUM CHLORIDE, SODIUM LACTATE, POTASSIUM CHLORIDE, CALCIUM CHLORIDE 600; 310; 30; 20 MG/100ML; MG/100ML; MG/100ML; MG/100ML
INJECTION, SOLUTION INTRAVENOUS CONTINUOUS PRN
Status: DISCONTINUED | OUTPATIENT
Start: 2024-09-18 | End: 2024-09-18

## 2024-09-18 RX ORDER — LIDOCAINE HYDROCHLORIDE 10 MG/ML
INJECTION, SOLUTION EPIDURAL; INFILTRATION; INTRACAUDAL; PERINEURAL AS NEEDED
Status: DISCONTINUED | OUTPATIENT
Start: 2024-09-18 | End: 2024-09-18

## 2024-09-18 RX ADMIN — Medication 40 MG: at 11:34

## 2024-09-18 RX ADMIN — LIDOCAINE HYDROCHLORIDE 50 MG: 10 INJECTION, SOLUTION EPIDURAL; INFILTRATION; INTRACAUDAL at 11:31

## 2024-09-18 RX ADMIN — PROPOFOL 100 MG: 10 INJECTION, EMULSION INTRAVENOUS at 11:31

## 2024-09-18 RX ADMIN — SODIUM CHLORIDE, SODIUM LACTATE, POTASSIUM CHLORIDE, AND CALCIUM CHLORIDE: .6; .31; .03; .02 INJECTION, SOLUTION INTRAVENOUS at 11:03

## 2024-09-18 RX ADMIN — PROPOFOL 100 MCG/KG/MIN: 10 INJECTION, EMULSION INTRAVENOUS at 11:31

## 2024-09-18 NOTE — ANESTHESIA POSTPROCEDURE EVALUATION
Post-Op Assessment Note    CV Status:  Stable  Pain Score: 0    Pain management: adequate       Mental Status:  Alert and awake   Hydration Status:  Euvolemic   PONV Controlled:  Controlled   Airway Patency:  Patent     Post Op Vitals Reviewed: Yes    No anethesia notable event occurred.    Staff: CRNA               /84 (09/18/24 1149)    Temp 97.8 °F (36.6 °C) (09/18/24 1149)    Pulse 83 (09/18/24 1149)   Resp 18 (09/18/24 1149)    SpO2 96 % (09/18/24 1149)

## 2024-09-18 NOTE — ANESTHESIA PREPROCEDURE EVALUATION
Procedure:  COLONOSCOPY    Relevant Problems   Other   (+) Splenomegaly        Physical Exam    Airway    Mallampati score: II  TM Distance: >3 FB  Neck ROM: full     Dental   No notable dental hx     Cardiovascular      Pulmonary      Other Findings        Anesthesia Plan  ASA Score- 2     Anesthesia Type- IV sedation with anesthesia with ASA Monitors.         Additional Monitors:     Airway Plan:            Plan Factors-Exercise tolerance (METS): >4 METS.    Chart reviewed.    Patient summary reviewed.    Patient is not a current smoker.              Induction- intravenous.    Postoperative Plan-         Informed Consent- Anesthetic plan and risks discussed with patient.  I personally reviewed this patient with the CRNA. Discussed and agreed on the Anesthesia Plan with the CRNA..

## 2024-09-18 NOTE — H&P
"History and Physical -  [facility]  Tevin Rebolledo 54 y.o. male MRN: [id number]    HPI: Tevin Rebolledo is a 54 y.o. year old male who presents for colonoscopy.  He had personal history of colon polyps      Review of Systems    Historical Information   Past Medical History:   Diagnosis Date    Colon polyp      Past Surgical History:   Procedure Laterality Date    COLONOSCOPY       Social History   Social History     Substance and Sexual Activity   Alcohol Use Not Currently    Alcohol/week: 1.0 standard drink of alcohol    Types: 1 Cans of beer per week     Social History     Substance and Sexual Activity   Drug Use Not Currently     Social History     Tobacco Use   Smoking Status Never    Passive exposure: Never   Smokeless Tobacco Never     Family History   Problem Relation Age of Onset    Colon cancer Father        Meds/Allergies     Not in a hospital admission.    No Known Allergies    Objective     /82   Pulse 83   Temp (!) 97.2 °F (36.2 °C) (Temporal)   Resp 16   Ht 5' 9\" (1.753 m)   Wt 99.8 kg (220 lb)   SpO2 98%   BMI 32.49 kg/m²       PHYSICAL EXAM    Gen: NAD  CV: RRR  CHEST: Clear  ABD: soft, NT/ND  EXT: no edema  Neuro: AAO      ASSESSMENT/PLAN:  This is a 54 y.o. year old male here for colonoscopy for personal history of colon polyps    PLAN:   Procedure: Colonoscopy with biopsy and possible polypectomy      "